# Patient Record
Sex: MALE | Race: WHITE | NOT HISPANIC OR LATINO | Employment: FULL TIME | URBAN - NONMETROPOLITAN AREA
[De-identification: names, ages, dates, MRNs, and addresses within clinical notes are randomized per-mention and may not be internally consistent; named-entity substitution may affect disease eponyms.]

---

## 2017-04-03 ENCOUNTER — COMMUNICATION - GICH (OUTPATIENT)
Dept: FAMILY MEDICINE | Facility: OTHER | Age: 42
End: 2017-04-03

## 2017-04-03 DIAGNOSIS — Z20.828 CONTACT WITH AND (SUSPECTED) EXPOSURE TO OTHER VIRAL COMMUNICABLE DISEASES: ICD-10-CM

## 2018-01-04 NOTE — TELEPHONE ENCOUNTER
Patient Information     Patient Name MRN Sex Lance Last [id number] Male 1975      Telephone Encounter by Kelli Maloney MD at 4/3/2017  4:10 PM     Author:  Kelli Maloney MD Service:  (none) Author Type:  Physician     Filed:  4/3/2017  4:11 PM Encounter Date:  4/3/2017 Status:  Signed     :  Kelli Maloney MD (Physician)            Prescription for tamiflu 75 mg daily x 10 days sent to [facility] pharmacy in [place], [place].  Kelli Maloney MD ....................  4/3/2017   4:11 PM

## 2018-01-04 NOTE — TELEPHONE ENCOUNTER
Patient Information     Patient Name MRN Lance Avina [id number] Male 1975      Telephone Encounter by Nieves Rivero at 4/3/2017  3:57 PM     Author:  Nieves Rivero Service:  (none) Author Type:  (none)     Filed:  4/3/2017  4:00 PM Encounter Date:  4/3/2017 Status:  Signed     :  Nieves Rivero            Call from wife with the pharmacy to send a Tamiflu prescription to.  I loaded this into chart.  WifeLatisha only needs a call back if there is a problem.  Nieves Rivero CMA (AAMA)................ 4/3/2017 4:00 PM

## 2018-02-19 ENCOUNTER — DOCUMENTATION ONLY (OUTPATIENT)
Dept: FAMILY MEDICINE | Facility: OTHER | Age: 43
End: 2018-02-19

## 2018-02-19 RX ORDER — LISINOPRIL 10 MG/1
1 TABLET ORAL DAILY
COMMUNITY
Start: 2013-08-27 | End: 2022-01-07

## 2018-02-19 RX ORDER — TRIAMTERENE/HYDROCHLOROTHIAZID 37.5-25 MG
1 TABLET ORAL DAILY
COMMUNITY
Start: 2013-08-27

## 2021-01-05 ENCOUNTER — VIRTUAL VISIT (OUTPATIENT)
Dept: FAMILY MEDICINE | Facility: OTHER | Age: 46
End: 2021-01-05
Attending: PHYSICIAN ASSISTANT
Payer: COMMERCIAL

## 2021-01-05 VITALS — HEIGHT: 73 IN | WEIGHT: 260 LBS | BODY MASS INDEX: 34.46 KG/M2

## 2021-01-05 DIAGNOSIS — M10.9 ACUTE GOUT OF LEFT FOOT, UNSPECIFIED CAUSE: Primary | ICD-10-CM

## 2021-01-05 PROCEDURE — 99212 OFFICE O/P EST SF 10 MIN: CPT | Mod: 95 | Performed by: PHYSICIAN ASSISTANT

## 2021-01-05 RX ORDER — PREDNISONE 20 MG/1
20 TABLET ORAL 2 TIMES DAILY
Qty: 20 TABLET | Refills: 0 | Status: SHIPPED | OUTPATIENT
Start: 2021-01-05 | End: 2022-01-07

## 2021-01-05 SDOH — HEALTH STABILITY: MENTAL HEALTH: HOW OFTEN DO YOU HAVE 6 OR MORE DRINKS ON ONE OCCASION?: NOT ASKED

## 2021-01-05 SDOH — HEALTH STABILITY: MENTAL HEALTH: HOW OFTEN DO YOU HAVE A DRINK CONTAINING ALCOHOL?: NOT ASKED

## 2021-01-05 SDOH — HEALTH STABILITY: MENTAL HEALTH: HOW MANY STANDARD DRINKS CONTAINING ALCOHOL DO YOU HAVE ON A TYPICAL DAY?: NOT ASKED

## 2021-01-05 ASSESSMENT — MIFFLIN-ST. JEOR: SCORE: 2108.23

## 2021-01-05 NOTE — NURSING NOTE
Patient states that he has gout in left foot.  Mica Larry LPN ....................  1/5/2021   9:49 AM

## 2021-01-05 NOTE — PROGRESS NOTES
Lance Vergara is a 45 year old male who is being evaluated via a billable telephone visit.      What phone number would you like to be contacted at? [phone]  How would you like to obtain your AVS? Mail a copy  Assessment & Plan     1. Acute gout of left foot, unspecified cause      Symptoms consistent with gout flare. Will treat with prednisone as has done well with that in the past. Educated on dietary and lifestyle changes to help prevent additional flares. Follow up as needed.     10 minutes spent on the date of the encounter doing chart review, patient visit and documentation     Tobacco Cessation:   reports that he has never smoked. He uses smokeless tobacco.    Susana Vance PA-C  [facility]    Subjective       HPI   Lance Vergara is a 45 year old male who is contacted via telephone for discussion regarding gout. States he has had a few flares in the past and has done well with daily BID dosing of prednisone for 5-10 days. State he was working in [place] a few weeks ago when he had another flare in his left foot. He was seen out there and they prescribed a prednisone taper. States that helped with the pain but the swelling and erythema did not improve. A few days ago he again developed pain in the left foot. States his uric acid level was checked at the [place] clinic and it was in the upper limits of normal. No known injury to the area. States he had been eating more seafood and pickled foods which he feels prompted the two flares.       PAST MEDICAL HISTORY:   Past Medical History:   Diagnosis Date     Atrial fibrillation (H)     No Comments Provided     Essential hypertension     No Comments Provided     Obesity     No Comments Provided       PAST SURGICAL HISTORY: No past surgical history on file.    FAMILY HISTORY: No family history on file.    SOCIAL HISTORY:   Social History     Tobacco Use     Smoking status: Never Smoker     Smokeless tobacco: Current User   Substance Use  Topics     Alcohol use: Not Currently      No Known Allergies  Current Outpatient Medications   Medication     lisinopril (PRINIVIL/ZESTRIL) 10 MG tablet     triamterene-hydrochlorothiazide (MAXZIDE-25) 37.5-25 MG per tablet     No current facility-administered medications for this visit.          Review of Systems   Constitutional, HEENT, cardiovascular, pulmonary, gi and gu systems are negative, except as otherwise noted.      Objective         Vitals:  No vitals were obtained today due to virtual visit.    Physical Exam   healthy, alert and no distress  PSYCH: Alert and oriented times 3; coherent speech, normal   rate and volume, able to articulate logical thoughts, able   to abstract reason, no tangential thoughts, no hallucinations   or delusions  His affect is normal  RESP: No cough, no audible wheezing, able to talk in full sentences  Remainder of exam unable to be completed due to telephone visits          Phone call duration: 5 minutes    Susana Vance PA-C on 1/5/2021 at 9:54 AM

## 2022-01-07 ENCOUNTER — VIRTUAL VISIT (OUTPATIENT)
Dept: FAMILY MEDICINE | Facility: OTHER | Age: 47
End: 2022-01-07
Attending: FAMILY MEDICINE

## 2022-01-07 VITALS
HEART RATE: 64 BPM | SYSTOLIC BLOOD PRESSURE: 124 MMHG | BODY MASS INDEX: 34.96 KG/M2 | TEMPERATURE: 98.4 F | DIASTOLIC BLOOD PRESSURE: 74 MMHG | WEIGHT: 265 LBS

## 2022-01-07 DIAGNOSIS — M10.9 ACUTE GOUT OF LEFT FOOT, UNSPECIFIED CAUSE: Primary | ICD-10-CM

## 2022-01-07 PROCEDURE — 99212 OFFICE O/P EST SF 10 MIN: CPT | Mod: 95 | Performed by: FAMILY MEDICINE

## 2022-01-07 RX ORDER — LISINOPRIL 30 MG/1
TABLET ORAL
COMMUNITY
Start: 2021-11-26

## 2022-01-07 RX ORDER — SILDENAFIL 50 MG/1
50-100 TABLET, FILM COATED ORAL
COMMUNITY
Start: 2021-11-11

## 2022-01-07 RX ORDER — DILTIAZEM HYDROCHLORIDE 240 MG/1
240 CAPSULE, EXTENDED RELEASE ORAL
COMMUNITY
Start: 2022-01-01 | End: 2022-01-31

## 2022-01-07 RX ORDER — PREDNISONE 20 MG/1
20 TABLET ORAL 2 TIMES DAILY
Qty: 20 TABLET | Refills: 0 | Status: SHIPPED | OUTPATIENT
Start: 2022-01-07 | End: 2022-01-14

## 2022-01-07 RX ORDER — METOPROLOL TARTRATE 100 MG
100 TABLET ORAL
COMMUNITY
Start: 2021-10-27

## 2022-01-07 RX ORDER — ATORVASTATIN CALCIUM 40 MG/1
TABLET, FILM COATED ORAL
COMMUNITY
Start: 2021-11-10

## 2022-01-07 ASSESSMENT — PAIN SCALES - GENERAL: PAINLEVEL: SEVERE PAIN (7)

## 2022-01-07 NOTE — NURSING NOTE
Patient is needing visit today for left big toe pain and swelling. Patient reports having a history of gout.    Medication Reconciliation Complete    Kamla Mata LPN  1/7/2022 2:20 PM

## 2022-01-07 NOTE — PROGRESS NOTES
Lance is a 46 year old who is being evaluated via a billable telephone visit.      What phone number would you like to be contacted at? [phone]  How would you like to obtain your AVS?     Assessment & Plan     Acute gout of left foot, unspecified cause  He has had this numerous times in the past and reports that this feels similar.  He has been on numerous courses of steroids and other anti-inflammatories in the past and reports that prednisone 20 mg twice daily for 10 days works quite well for him.  He is tolerated this well in the past.  We'll send that in again for him.  He'll contact me next week if this is not helping.  - predniSONE (DELTASONE) 20 MG tablet; Take 1 tablet (20 mg) by mouth 2 times daily for 10 days    Jose Luis Richards  [facility]    Subjective   Lance is a 46 year old who presents for the following health issues      HPI     Gout/ Single Inflamed Joint  He has a history of recurrent gout.  Recently he was in the emergency department in [place] where he was getting Lovenox injections.  He was told that this may cause a flareup of his gout.  He has had pain, swelling and redness of his left great toe that is consistent with his gout flareups in the past.    Review of Systems   Constitutional, HEENT, cardiovascular, pulmonary, gi and gu systems are negative, except as otherwise noted.      Objective    Vitals - Patient Reported  Pain Score: Severe Pain (7)        Physical Exam   healthy, alert and no distress  PSYCH: Alert and oriented times 3; coherent speech, normal   rate and volume, able to articulate logical thoughts, able   to abstract reason, no tangential thoughts, no hallucinations   or delusions  His affect is normal  RESP: No cough, no audible wheezing, able to talk in full sentences  Remainder of exam unable to be completed due to telephone visits        Phone call duration: 6 minutes

## 2022-01-14 DIAGNOSIS — M10.9 ACUTE GOUT OF LEFT FOOT, UNSPECIFIED CAUSE: ICD-10-CM

## 2022-01-14 RX ORDER — PREDNISONE 20 MG/1
20 TABLET ORAL 2 TIMES DAILY
Qty: 20 TABLET | Refills: 0 | Status: SHIPPED | OUTPATIENT
Start: 2022-01-14 | End: 2022-04-29

## 2022-01-14 NOTE — TELEPHONE ENCOUNTER
predniSONE (DELTASONE) 20 MG tablet 20 tablet 0 1/7/2022 1/17/2022 --   Sig - Route: Take 1 tablet (20 mg) by mouth 2 times daily for 10 days - Oral   Sent to pharmacy as: predniSONE 20 MG Oral Tablet (DELTASONE)   Class: E-Prescribe   Order: [id number]   E-Prescribing Status: Receipt confirmed by pharmacy (1/7/2022  2:45 PM CST)     LOV 01/07/2022 virtual visit with Dr. Richards. Noted patient was given 10 day course and advised to follow up if symptoms did not improve. Patient requests refill only at this time.  Previously prescribed via Dr. Richards. Unable to complete prescription refill per RN Medication Refill Policy.................... Sherly Fregoso RN ....................  1/14/2022   10:33 AM

## 2022-01-14 NOTE — TELEPHONE ENCOUNTER
Reason for call: Medication or medication refill    Name of medication requested: Prednisone    Are you out of the medication? Yes    What pharmacy do you use? [facility] in [place]    Preferred method for responding to this message: Telephone Call    Phone number patient can be reached at: Home number on file [phone]    If we cannot reach you directly, may we leave a detailed response at the number you provided? Yes     Patient states his gout is almost gone but not quite. He is able to walk on his foot but he still has some gout

## 2022-04-29 ENCOUNTER — HOSPITAL ENCOUNTER (EMERGENCY)
Facility: OTHER | Age: 47
Discharge: HOME | End: 2022-04-29
Attending: STUDENT IN AN ORGANIZED HEALTH CARE EDUCATION/TRAINING PROGRAM
Payer: COMMERCIAL

## 2022-04-29 ENCOUNTER — HOSPITAL ENCOUNTER (OUTPATIENT)
Dept: CARDIOLOGY | Facility: OTHER | Age: 47
Discharge: HOME | End: 2022-04-29
Attending: STUDENT IN AN ORGANIZED HEALTH CARE EDUCATION/TRAINING PROGRAM
Payer: COMMERCIAL

## 2022-04-29 VITALS
TEMPERATURE: 97.7 F | HEIGHT: 72 IN | OXYGEN SATURATION: 98 % | HEART RATE: 71 BPM | BODY MASS INDEX: 36.84 KG/M2 | DIASTOLIC BLOOD PRESSURE: 55 MMHG | RESPIRATION RATE: 11 BRPM | SYSTOLIC BLOOD PRESSURE: 117 MMHG | WEIGHT: 272 LBS

## 2022-04-29 DIAGNOSIS — R00.2 PALPITATIONS: ICD-10-CM

## 2022-04-29 DIAGNOSIS — E87.6 HYPOKALEMIA: ICD-10-CM

## 2022-04-29 DIAGNOSIS — I49.3 FREQUENT PVCS: ICD-10-CM

## 2022-04-29 DIAGNOSIS — M10.071 ACUTE IDIOPATHIC GOUT OF RIGHT FOOT: ICD-10-CM

## 2022-04-29 LAB
ANION GAP SERPL CALCULATED.3IONS-SCNC: 9 MMOL/L (ref 3–14)
BASOPHILS # BLD AUTO: 0.1 10E3/UL (ref 0–0.2)
BASOPHILS NFR BLD AUTO: 1 %
BUN SERPL-MCNC: 10 MG/DL (ref 7–25)
CALCIUM SERPL-MCNC: 9.4 MG/DL (ref 8.6–10.3)
CHLORIDE BLD-SCNC: 100 MMOL/L (ref 98–107)
CO2 SERPL-SCNC: 28 MMOL/L (ref 21–31)
CREAT SERPL-MCNC: 0.87 MG/DL (ref 0.7–1.3)
EOSINOPHIL # BLD AUTO: 0.3 10E3/UL (ref 0–0.7)
EOSINOPHIL NFR BLD AUTO: 2 %
ERYTHROCYTE [DISTWIDTH] IN BLOOD BY AUTOMATED COUNT: 12.1 % (ref 10–15)
GFR SERPL CREATININE-BSD FRML MDRD: >90 ML/MIN/1.73M2
GLUCOSE BLD-MCNC: 152 MG/DL (ref 70–105)
HCT VFR BLD AUTO: 47.2 % (ref 40–53)
HGB BLD-MCNC: 16.2 G/DL (ref 13.3–17.7)
IMM GRANULOCYTES # BLD: 0 10E3/UL
IMM GRANULOCYTES NFR BLD: 0 %
LYMPHOCYTES # BLD AUTO: 3.4 10E3/UL (ref 0.8–5.3)
LYMPHOCYTES NFR BLD AUTO: 30 %
MAGNESIUM SERPL-MCNC: 2 MG/DL (ref 1.9–2.7)
MCH RBC QN AUTO: 31.7 PG (ref 26.5–33)
MCHC RBC AUTO-ENTMCNC: 34.3 G/DL (ref 31.5–36.5)
MCV RBC AUTO: 92 FL (ref 78–100)
MONOCYTES # BLD AUTO: 0.7 10E3/UL (ref 0–1.3)
MONOCYTES NFR BLD AUTO: 6 %
NEUTROPHILS # BLD AUTO: 7 10E3/UL (ref 1.6–8.3)
NEUTROPHILS NFR BLD AUTO: 61 %
NRBC # BLD AUTO: 0 10E3/UL
NRBC BLD AUTO-RTO: 0 /100
PLATELET # BLD AUTO: 272 10E3/UL (ref 150–450)
POTASSIUM BLD-SCNC: 3.4 MMOL/L (ref 3.5–5.1)
RBC # BLD AUTO: 5.11 10E6/UL (ref 4.4–5.9)
SODIUM SERPL-SCNC: 137 MMOL/L (ref 134–144)
TROPONIN I SERPL-MCNC: 5.1 PG/ML (ref 0–34)
TSH SERPL DL<=0.005 MIU/L-ACNC: 0.98 MU/L (ref 0.4–4)
WBC # BLD AUTO: 11.5 10E3/UL (ref 4–11)

## 2022-04-29 PROCEDURE — 36415 COLL VENOUS BLD VENIPUNCTURE: CPT | Performed by: STUDENT IN AN ORGANIZED HEALTH CARE EDUCATION/TRAINING PROGRAM

## 2022-04-29 PROCEDURE — 80048 BASIC METABOLIC PNL TOTAL CA: CPT | Performed by: STUDENT IN AN ORGANIZED HEALTH CARE EDUCATION/TRAINING PROGRAM

## 2022-04-29 PROCEDURE — 99284 EMERGENCY DEPT VISIT MOD MDM: CPT | Performed by: STUDENT IN AN ORGANIZED HEALTH CARE EDUCATION/TRAINING PROGRAM

## 2022-04-29 PROCEDURE — 99285 EMERGENCY DEPT VISIT HI MDM: CPT | Mod: 25 | Performed by: STUDENT IN AN ORGANIZED HEALTH CARE EDUCATION/TRAINING PROGRAM

## 2022-04-29 PROCEDURE — 93005 ELECTROCARDIOGRAM TRACING: CPT | Performed by: STUDENT IN AN ORGANIZED HEALTH CARE EDUCATION/TRAINING PROGRAM

## 2022-04-29 PROCEDURE — 250N000013 HC RX MED GY IP 250 OP 250 PS 637: Performed by: STUDENT IN AN ORGANIZED HEALTH CARE EDUCATION/TRAINING PROGRAM

## 2022-04-29 PROCEDURE — 93242 EXT ECG>48HR<7D RECORDING: CPT

## 2022-04-29 PROCEDURE — 83735 ASSAY OF MAGNESIUM: CPT | Performed by: STUDENT IN AN ORGANIZED HEALTH CARE EDUCATION/TRAINING PROGRAM

## 2022-04-29 PROCEDURE — 85025 COMPLETE CBC W/AUTO DIFF WBC: CPT | Performed by: STUDENT IN AN ORGANIZED HEALTH CARE EDUCATION/TRAINING PROGRAM

## 2022-04-29 PROCEDURE — 84484 ASSAY OF TROPONIN QUANT: CPT | Performed by: STUDENT IN AN ORGANIZED HEALTH CARE EDUCATION/TRAINING PROGRAM

## 2022-04-29 PROCEDURE — 93010 ELECTROCARDIOGRAM REPORT: CPT | Performed by: INTERNAL MEDICINE

## 2022-04-29 PROCEDURE — 84443 ASSAY THYROID STIM HORMONE: CPT | Performed by: STUDENT IN AN ORGANIZED HEALTH CARE EDUCATION/TRAINING PROGRAM

## 2022-04-29 RX ORDER — PREDNISONE 20 MG/1
20 TABLET ORAL 2 TIMES DAILY
Qty: 10 TABLET | Refills: 0 | Status: SHIPPED | OUTPATIENT
Start: 2022-04-29 | End: 2022-05-02

## 2022-04-29 RX ORDER — LORAZEPAM 1 MG/1
1 TABLET ORAL 2 TIMES DAILY PRN
Qty: 4 TABLET | Refills: 0 | Status: SHIPPED | OUTPATIENT
Start: 2022-04-29

## 2022-04-29 RX ORDER — LORAZEPAM 1 MG/1
1 TABLET ORAL ONCE
Status: COMPLETED | OUTPATIENT
Start: 2022-04-29 | End: 2022-04-29

## 2022-04-29 RX ADMIN — LORAZEPAM 1 MG: 1 TABLET ORAL at 16:45

## 2022-04-29 NOTE — ED TRIAGE NOTES
ED Nursing Triage Note (General)   ________________________________    Lance Vergara is a 46 year old Male that presents to triage private car  With history of  Palpations has hx of high blood pressure, States has had this in past  reported by patient   Significant symptoms had onset started Sunday, states common to have couple a day   BP (!) 143/61   Pulse 76   Temp 97.7  F (36.5  C) (Temporal)   Resp 20   Ht 1.829 m (6')   Wt 123.4 kg (272 lb)   SpO2 98%   BMI 36.89 kg/m  t  Patient appears alert  and oriented, in no acute distress, but was mildly anxious., and cooperative behavior.    GCS Total = 15  Airway: intact  Breathing noted as Normal  Circulation Normal  Skin:  Normal  HR in normal range. Pulse slight irregular       PRE HOSPITAL PRIOR LIVING SITUATION Spouse and Children   Triage Assessment     Row Name 04/29/22 9051       Triage Assessment (Adult)    Airway WDL WDL       Respiratory WDL    Respiratory WDL WDL       Skin Circulation/Temperature WDL    Skin Circulation/Temperature WDL X;temperature    Skin Temperature warm       Cardiac WDL    Cardiac WDL X;rhythm    Cardiac Rhythm apical pulse irregular  HR 76       Peripheral/Neurovascular WDL    Peripheral Neurovascular WDL WDL       Cognitive/Neuro/Behavioral WDL    Cognitive/Neuro/Behavioral WDL WDL

## 2022-04-29 NOTE — DISCHARGE INSTRUCTIONS
You were found to have frequent premature ventricular contractions, which is a type of extra beating in the lower chambers of your heart. No dangerous cause of this was discovered today. You will need to follow up closely with your primary doctor next week (schedule a visit) and also as planned with your cardiologist mid-May.    You can speak with your primary doctor next week about further evaluation and management of your anxiety. Much of this is related to significant recent life-stressors. For now, take ativan 1 mg twice daily as needed for severe anxiety symptoms or any other acute concerns. This is a controlled substance and carries risk of dependence. This is not a good long-term treatment for anxiety.    Hold diltiazem for now but call your cardiologist office on Monday about restarting this; it may need to be restarted at a different dose.    Continue your other medications as prescribed.     prednisone at the pharmacy for treatment of gout in your right foot. Discuss with your doctor about stopping hydrochlorothiazide (one of your blood pressure medications) which can increase risk of gout attacks.    Your potassium was a bit low. This should improve with a normal diet.    A ZioPatch was placed today. Send this in in 1 week to have it analyzed and follow up on results with your cardiologist.    Come back to the ER immediately or call 911 if new or worsening symptoms including worsening palpitations, chest pain or difficulty breathing, or any other acute concerns.

## 2022-04-29 NOTE — ED PROVIDER NOTES
History     Chief Complaint   Patient presents with     Palpitations     HPI  Lance Vergara is a 46 year old man with history of HTN, obesity, HLD, paroxysmal a.fib on metoprolol (previously on diltiazem), not on anticoagulation including aspirin who presents for evaluation of palpitations. No chest pain or SOB, nausea/vomiting, sweating, or any other complaints but reports palpitations starting 5 days ago that worsened today.  Symptoms been present throughout the day, seem to come and go at random but more persistent today.  He does report feeling a bit dehydrated and that his urine was dark this morning.  He denies significant caffeine use.  He does note that his father passed away 3 weeks ago and he has been under a great deal of stress and anxiety from this.  He notes some racing thoughts and difficulty sleeping at night.    Allergies:  No Known Allergies    Problem List:    Patient Active Problem List    Diagnosis Date Noted     Hyperlipidemia 01/28/2016     Priority: Medium     Paroxysmal A-fib (H) 09/10/2015     Priority: Medium     HTN (hypertension) 10/16/2013     Priority: Medium     Obesity 10/16/2013     Priority: Medium        Past Medical History:    Past Medical History:   Diagnosis Date     Atrial fibrillation (H)      Essential hypertension      Obesity        Past Surgical History:    History reviewed. No pertinent surgical history.    Family History:    History reviewed. No pertinent family history.    Social History:  Marital Status:   [2]  Social History     Tobacco Use     Smoking status: Never Smoker     Smokeless tobacco: Current User     Types: Chew   Vaping Use     Vaping Use: Never used   Substance Use Topics     Alcohol use: Not Currently     Comment: 2 weeks ago last drink     Drug use: Not Currently     Comment: Drug use: No        Medications:    atorvastatin (LIPITOR) 40 MG tablet  lisinopril (ZESTRIL) 30 MG tablet  LORazepam (ATIVAN) 1 MG tablet  metoprolol tartrate  (LOPRESSOR) 100 MG tablet  predniSONE (DELTASONE) 20 MG tablet  triamterene-hydrochlorothiazide (MAXZIDE-25) 37.5-25 MG per tablet  diltiazem ER (DILT-XR) 240 MG 24 hr ER beaded capsule  sildenafil (VIAGRA) 50 MG tablet          Review of Systems  Please see HPI above for pertinent positives and negatives.  All other systems reviewed and found to be negative.    Physical Exam   BP: (!) 143/61  Pulse: 76  Temp: 97.7  F (36.5  C)  Resp: 20  Height: 182.9 cm (6')  Weight: 123.4 kg (272 lb)  SpO2: 98 %      Physical Exam  Gen: Lying in bed, alert, nontoxic, no acute distress   HEENT: normocephalic and atraumatic, mucous memories moist, conjunctiva clear regular rate and rhythm but frequent  CV: Regular rate and rhythm but frequent extra beats, no murmurs, appears warm and well-perfused  Pulm: Clear bilaterally, normal respiratory effort  Abd: Soft, nontender, nondistended  Skin: No rash or lesions  MSK: No gross deformities or swelling  Neuro: Alert and oriented x4, no focal deficits    ED Course              ED Course as of 22 0915      1623 EKG reviewed, sinus rhythm with a couple of PVCs, no STEMI or current of injury, rate is 74 bpm, normal OK interval, QRS, and QTc.   1636 Patient evaluated, here with palpitations over the past 5 days, now worse today. No a.fib on monitor but frequent PVC's. He has been under a great deal of stress lately, states his father  3 weeks ago and he has been under a lot of stress/anxiety. No chest pain or difficulty breathing or other symptoms besides racing thoughts. No thoughts of self harm. Also feels dehydrated, urine has been darker. Plan for oral hydration, basic labs, period of cardiac monitoring here, ativan for anxiety. Anticipate discharge with ZioPatch and close outpatient cardiac follow up   1704 CBC with very mild leukocytosis to 11.5, otherwise unremarkable, normal hemoglobin and platelets   1729 Troponin within normal limits   1736 Magnesium  normal. BMP with mild hypokalemia to 3.4, otherwise unremarkable     Procedures              Critical Care time:  none               Results for orders placed or performed during the hospital encounter of 04/29/22 (from the past 24 hour(s))   CBC with platelets differential    Narrative    The following orders were created for panel order CBC with platelets differential.  Procedure                               Abnormality         Status                     ---------                               -----------         ------                     CBC with platelets and d...[[id number]]  Abnormal            Final result                 Please view results for these tests on the individual orders.   Basic metabolic panel   Result Value Ref Range    Sodium 137 134 - 144 mmol/L    Potassium 3.4 (L) 3.5 - 5.1 mmol/L    Chloride 100 98 - 107 mmol/L    Carbon Dioxide (CO2) 28 21 - 31 mmol/L    Anion Gap 9 3 - 14 mmol/L    Urea Nitrogen 10 7 - 25 mg/dL    Creatinine 0.87 0.70 - 1.30 mg/dL    Calcium 9.4 8.6 - 10.3 mg/dL    Glucose 152 (H) 70 - 105 mg/dL    GFR Estimate >90 >60 mL/min/1.73m2   Troponin I   Result Value Ref Range    Troponin I 5.1 0.0 - 34.0 pg/mL   Magnesium   Result Value Ref Range    Magnesium 2.0 1.9 - 2.7 mg/dL   CBC with platelets and differential   Result Value Ref Range    WBC Count 11.5 (H) 4.0 - 11.0 10e3/uL    RBC Count 5.11 4.40 - 5.90 10e6/uL    Hemoglobin 16.2 13.3 - 17.7 g/dL    Hematocrit 47.2 40.0 - 53.0 %    MCV 92 78 - 100 fL    MCH 31.7 26.5 - 33.0 pg    MCHC 34.3 31.5 - 36.5 g/dL    RDW 12.1 10.0 - 15.0 %    Platelet Count 272 150 - 450 10e3/uL    % Neutrophils 61 %    % Lymphocytes 30 %    % Monocytes 6 %    % Eosinophils 2 %    % Basophils 1 %    % Immature Granulocytes 0 %    NRBCs per 100 WBC 0 <1 /100    Absolute Neutrophils 7.0 1.6 - 8.3 10e3/uL    Absolute Lymphocytes 3.4 0.8 - 5.3 10e3/uL    Absolute Monocytes 0.7 0.0 - 1.3 10e3/uL    Absolute Eosinophils 0.3 0.0 - 0.7 10e3/uL     Absolute Basophils 0.1 0.0 - 0.2 10e3/uL    Absolute Immature Granulocytes 0.0 <=0.4 10e3/uL    Absolute NRBCs 0.0 10e3/uL   TSH Reflex GH   Result Value Ref Range    TSH 0.98 0.40 - 4.00 mU/L       Medications   LORazepam (ATIVAN) tablet 1 mg (1 mg Oral Given 4/29/22 1645)       Assessments & Plan (with Medical Decision Making)   46 year old man with history of HTN, obesity, HLD, paroxysmal a.fib on metoprolol (previously on diltiazem), not on anticoagulation including aspirin who presents for evaluation of palpitations that started about 5 days ago but worsened today.  Vitally stable here, afebrile, saturating well on room air.  Patient with normal sinus rhythm but frequent PVCs on cardiac monitoring and on EKG, no ischemic changes.  Lab work was unrevealing.  Patient notably has had significant increase stress in his life including the recent death of his father 3 weeks ago likely contributing to these PVCs, he was given lorazepam 1 mg orally and improved on recheck.  No evidence of atrial fibrillation notably here, patient does have a [id number] of 1 and so would possibly benefit from full dose aspirin but will let him discuss this further with his primary provider and cardiologist.  Instructed him to continue to hold diltiazem at this time as he has been off it for about a month and would prefer not to restart such a high dose of the medication especially given that his blood pressure improved here to normal after treatment of his anxiety with Ativan.  During his ED course he did develop some pain in his right foot that is identical to prior gout attacks; will plan for prednisone burst which has worked very well for him in the past.  He will need close outpatient follow-up, did provide a Zio patch today for 1 week of monitoring, strict return precautions provided.    From ED discharge instructions:  You were found to have frequent premature ventricular contractions, which is a type of extra beating in the  lower chambers of your heart. No dangerous cause of this was discovered today. You will need to follow up closely with your primary doctor next week (schedule a visit) and also as planned with your cardiologist mid-May.    You can speak with your primary doctor next week about further evaluation and management of your anxiety. Much of this is related to significant recent life-stressors. For now, take ativan 1 mg twice daily as needed for severe anxiety symptoms or any other acute concerns. This is a controlled substance and carries risk of dependence. This is not a good long-term treatment for anxiety.    Hold diltiazem for now but call your cardiologist office on Monday about restarting this; it may need to be restarted at a different dose.    Continue your other medications as prescribed.     prednisone at the pharmacy for treatment of gout in your right foot. Discuss with your doctor about stopping hydrochlorothiazide (one of your blood pressure medications) which can increase risk of gout attacks.    Your potassium was a bit low. This should improve with a normal diet.    A ZioPatch was placed today. Send this in in 1 week to have it analyzed and follow up on results with your cardiologist.    Come back to the ER immediately or call 911 if new or worsening symptoms including worsening palpitations, chest pain or difficulty breathing, or any other acute concerns.    I have reviewed the nursing notes.    I have reviewed the findings, diagnosis, plan and need for follow up with the patient.       Discharge Medication List as of 4/29/2022  6:48 PM      START taking these medications    Details   LORazepam (ATIVAN) 1 MG tablet Take 1 tablet (1 mg) by mouth 2 times daily as needed for anxiety, Disp-4 tablet, R-0, E-Prescribe      predniSONE (DELTASONE) 20 MG tablet Take 1 tablet (20 mg) by mouth 2 times daily for 5 days, Disp-10 tablet, R-0, E-Prescribe             Final diagnoses:   Palpitations   Frequent  PVCs   Hypokalemia   Acute idiopathic gout of right foot       4/29/2022   [facility] Grover Bales MD  04/30/22 7812

## 2022-04-30 NOTE — PATIENT INSTRUCTIONS
Date Placed on Pt:  4/29/2022    Patient instructed not to:   -take baths, swim, sauna   -remove device prior to 7 days   -use electric blankets   -shower or sweat excessively within first 24 hours of device application  Patient instructed to:   -shower as needed   -be carefull when toweling off and dressing   -press button when cardiac symptoms occur   -document symptoms in log book   -remove and return device (send via mail to [facility] [employer])   -Call [facility] with any questions

## 2022-05-01 LAB
ATRIAL RATE - MUSE: 74 BPM
DIASTOLIC BLOOD PRESSURE - MUSE: NORMAL MMHG
INTERPRETATION ECG - MUSE: NORMAL
P AXIS - MUSE: 14 DEGREES
PR INTERVAL - MUSE: 160 MS
QRS DURATION - MUSE: 84 MS
QT - MUSE: 404 MS
QTC - MUSE: 448 MS
R AXIS - MUSE: -15 DEGREES
SYSTOLIC BLOOD PRESSURE - MUSE: NORMAL MMHG
T AXIS - MUSE: -1 DEGREES
VENTRICULAR RATE- MUSE: 74 BPM

## 2022-05-02 ENCOUNTER — OFFICE VISIT (OUTPATIENT)
Dept: FAMILY MEDICINE | Facility: OTHER | Age: 47
End: 2022-05-02
Attending: STUDENT IN AN ORGANIZED HEALTH CARE EDUCATION/TRAINING PROGRAM
Payer: COMMERCIAL

## 2022-05-02 VITALS
HEIGHT: 72 IN | SYSTOLIC BLOOD PRESSURE: 163 MMHG | WEIGHT: 285 LBS | DIASTOLIC BLOOD PRESSURE: 99 MMHG | BODY MASS INDEX: 38.6 KG/M2 | RESPIRATION RATE: 18 BRPM | OXYGEN SATURATION: 98 % | HEART RATE: 67 BPM | TEMPERATURE: 96.8 F

## 2022-05-02 DIAGNOSIS — F43.22 ACUTE ADJUSTMENT DISORDER WITH ANXIETY: Primary | ICD-10-CM

## 2022-05-02 DIAGNOSIS — I10 PRIMARY HYPERTENSION: ICD-10-CM

## 2022-05-02 PROCEDURE — 99213 OFFICE O/P EST LOW 20 MIN: CPT | Performed by: STUDENT IN AN ORGANIZED HEALTH CARE EDUCATION/TRAINING PROGRAM

## 2022-05-02 RX ORDER — HYDROXYZINE HYDROCHLORIDE 25 MG/1
25 TABLET, FILM COATED ORAL EVERY 4 HOURS PRN
Qty: 90 TABLET | Refills: 1 | Status: SHIPPED | OUTPATIENT
Start: 2022-05-02

## 2022-05-02 ASSESSMENT — ANXIETY QUESTIONNAIRES
GAD7 TOTAL SCORE: 8
7. FEELING AFRAID AS IF SOMETHING AWFUL MIGHT HAPPEN: NOT AT ALL
7. FEELING AFRAID AS IF SOMETHING AWFUL MIGHT HAPPEN: NOT AT ALL
3. WORRYING TOO MUCH ABOUT DIFFERENT THINGS: SEVERAL DAYS
6. BECOMING EASILY ANNOYED OR IRRITABLE: NOT AT ALL
1. FEELING NERVOUS, ANXIOUS, OR ON EDGE: MORE THAN HALF THE DAYS
4. TROUBLE RELAXING: MORE THAN HALF THE DAYS
GAD7 TOTAL SCORE: 8
5. BEING SO RESTLESS THAT IT IS HARD TO SIT STILL: SEVERAL DAYS
2. NOT BEING ABLE TO STOP OR CONTROL WORRYING: MORE THAN HALF THE DAYS
GAD7 TOTAL SCORE: 8

## 2022-05-02 ASSESSMENT — PAIN SCALES - GENERAL: PAINLEVEL: NO PAIN (0)

## 2022-05-02 NOTE — NURSING NOTE
Patient presents to clinic for follow up after ER visit on 04/29/22.  He was seen for palpitations, hypokalemia and gout of right foot.  Patient is current wearing a Ziopatch monitor for a week. No longer having palpitations at this time. He took last dose of Prednisone 20mg this morning which was given for gout.    Medication Rec Complete  Elodia Kaplan LPN............5/2/2022 8:40 AM

## 2022-05-02 NOTE — PROGRESS NOTES
Assessment & Plan     Acute adjustment disorder with anxiety  Anxiety likely due to recent sudden/unexpected death of his father. Discussed treatment options including ssri/snri but is not interested at this time. We did review the safety concerns of ativan and he is ok to try hydroxyzine. Also just completed prednisone for gout which I think is likely making sleep and anxiety much worse.    - hydrOXYzine (ATARAX) 25 MG tablet; Take 1 tablet (25 mg) by mouth every 4 hours as needed for anxiety    Primary hypertension  Not at goal but asymptomatic. Checks at home and is 130-140s usually. Completed prednisone today. Is quite anxious. Treatment of anxiety as above. Follow up with PCP if home numbers consistently >130/80s      Hector Lee MD  [facility]    Subjective   Lance is a 46 year old who presents for the following health issues   Patient presents to clinic for follow up after ER visit on 04/29/22.  He was seen for palpitations, hypokalemia and gout of right foot.  Patient is current wearing a Ziopatch monitor for a week. No longer having palpitations at this time. He took last dose of Prednisone 20mg this morning which was given for gout.    History of Present Illness       Mental Health Follow-up:  Patient presents to follow-up on Anxiety.    Patient's anxiety since last visit has been:  Good  The patient is not having other symptoms associated with anxiety.  Any significant life events: grief or loss  Patient is feeling anxious or having panic attacks.  Patient has no concerns about alcohol or drug use.         Today's EWA-7 Score: 8    Reason for visit:  Follow up from ER visit  Symptom onset:  1-2 weeks ago    He eats 2-3 servings of fruits and vegetables daily.He consumes 1 sweetened beverage(s) daily.He exercises with enough effort to increase his heart rate 10 to 19 minutes per day.  He exercises with enough effort to increase his heart rate 4 days per week.   He is taking  medications regularly.     Follow up ED visit   - seen in the ED 4 days ago for palpitations, found to have PVCs  - symptoms likely anxiety related. Recent stressor in life includes sudden death of his father 4 weeks ago   - was given ativan in the ED and felt much better  - given Rx for ativan on discharge and has used once and helped with symptoms   - no chest pain or palpitations today  - notices it at night while he can't sleep and is laying awake  - has history of afib and follows with cardiology  - today was last day of prednisone for gout flare            Review of Systems   Constitutional, HEENT, cardiovascular, pulmonary, gi and gu systems are negative, except as otherwise noted.      Objective    BP (!) 164/98 (BP Location: Right arm, Patient Position: Sitting, Cuff Size: Adult Large)   Pulse 72   Temp 96.8  F (36  C) (Tympanic)   Resp 18   Ht 1.829 m (6')   Wt 129.3 kg (285 lb)   SpO2 98%   BMI 38.65 kg/m    Body mass index is 38.65 kg/m .  Physical Exam   GENERAL: healthy, alert  RESP: lungs clear to auscultation - no rales, rhonchi or wheezes  CV: regular rate and rhythm, normal S1 S2, no S3 or S4, no murmur, click or rub, no peripheral edema and peripheral pulses strong  MS: no gross musculoskeletal defects noted, no edema  PSYCH: anxious

## 2022-05-03 ENCOUNTER — TELEPHONE (OUTPATIENT)
Dept: FAMILY MEDICINE | Facility: OTHER | Age: 47
End: 2022-05-03
Payer: COMMERCIAL

## 2022-05-03 DIAGNOSIS — M10.9 ACUTE GOUT OF LEFT FOOT, UNSPECIFIED CAUSE: Primary | ICD-10-CM

## 2022-05-03 RX ORDER — PREDNISONE 20 MG/1
20 TABLET ORAL 2 TIMES DAILY
Qty: 10 TABLET | Refills: 0 | Status: CANCELLED | OUTPATIENT
Start: 2022-05-03 | End: 2022-05-08

## 2022-05-03 RX ORDER — PREDNISONE 20 MG/1
20 TABLET ORAL 2 TIMES DAILY
Qty: 6 TABLET | Refills: 0 | Status: SHIPPED | OUTPATIENT
Start: 2022-05-03 | End: 2022-05-06

## 2022-05-03 ASSESSMENT — ANXIETY QUESTIONNAIRES: GAD7 TOTAL SCORE: 8

## 2022-05-03 NOTE — TELEPHONE ENCOUNTER
Patient most likely received a different dose of medication when [place] of [place].  20mg bid is the typical dose.  Prescription is sent to complete prescription.  Sol Christianson MD

## 2022-05-03 NOTE — TELEPHONE ENCOUNTER
Spoke with patient. He states that he was seen on Friday and that he was prescribed prednisone for his gout flare. He stated that he used to take Prednisone 2 tabs twice daily so 4 tabs per day when he lived in [place]. Informed patient that his past prescriptions in his chart have all been 1 tab twice daily. Informed him that his dose could have been different when he was in [place]. He is out of the prednisone and still going through the gout flare. He stated he normally takes the prednisone for 10 days so he would need another 5 days. Informed him that I would send his request to his PCP and advised him to take his medication as prescribed on the bottle. He verbalized his understanding. Enrrique Manriquez RN, BSN  ....................  5/3/2022   10:59 AM

## 2022-05-03 NOTE — TELEPHONE ENCOUNTER
Contacted patient and informed him that his PCP sent in more prednisone. Advised him to call back if his gout has not resolved after completing prednisone. Enrrique Manriquez RN, BSN  ....................  5/3/2022   3:21 PM

## 2022-05-03 NOTE — TELEPHONE ENCOUNTER
The patient called and stated he must have had miscommunication about his prednisone.  He needs more prednisone.  He went over the amount with the nurse and should have went over it with the doctor instead.  He did not get enough of the prednisone.   [employer]

## 2024-07-18 ENCOUNTER — ANESTHESIA (OUTPATIENT)
Dept: SURGERY | Facility: OTHER | Age: 49
End: 2024-07-18
Payer: COMMERCIAL

## 2024-07-18 ENCOUNTER — HOSPITAL ENCOUNTER (OUTPATIENT)
Facility: OTHER | Age: 49
Discharge: HOME | End: 2024-07-18
Attending: SURGERY | Admitting: SURGERY
Payer: COMMERCIAL

## 2024-07-18 ENCOUNTER — ANESTHESIA EVENT (OUTPATIENT)
Dept: SURGERY | Facility: OTHER | Age: 49
End: 2024-07-18
Payer: COMMERCIAL

## 2024-07-18 ENCOUNTER — OFFICE VISIT (OUTPATIENT)
Dept: FAMILY MEDICINE | Facility: OTHER | Age: 49
End: 2024-07-18
Payer: COMMERCIAL

## 2024-07-18 ENCOUNTER — TELEPHONE (OUTPATIENT)
Dept: FAMILY MEDICINE | Facility: OTHER | Age: 49
End: 2024-07-18

## 2024-07-18 VITALS
WEIGHT: 254 LBS | BODY MASS INDEX: 34.4 KG/M2 | HEIGHT: 72 IN | HEART RATE: 87 BPM | OXYGEN SATURATION: 93 % | SYSTOLIC BLOOD PRESSURE: 101 MMHG | DIASTOLIC BLOOD PRESSURE: 49 MMHG | RESPIRATION RATE: 16 BRPM | TEMPERATURE: 97.6 F

## 2024-07-18 VITALS
WEIGHT: 254 LBS | RESPIRATION RATE: 19 BRPM | HEIGHT: 72 IN | DIASTOLIC BLOOD PRESSURE: 82 MMHG | TEMPERATURE: 98.3 F | BODY MASS INDEX: 34.4 KG/M2 | OXYGEN SATURATION: 97 % | HEART RATE: 87 BPM | SYSTOLIC BLOOD PRESSURE: 144 MMHG

## 2024-07-18 DIAGNOSIS — R13.10 DYSPHAGIA, UNSPECIFIED TYPE: Primary | ICD-10-CM

## 2024-07-18 PROCEDURE — 250N000011 HC RX IP 250 OP 636: Performed by: NURSE ANESTHETIST, CERTIFIED REGISTERED

## 2024-07-18 PROCEDURE — 250N000009 HC RX 250: Performed by: NURSE ANESTHETIST, CERTIFIED REGISTERED

## 2024-07-18 PROCEDURE — 258N000003 HC RX IP 258 OP 636: Performed by: SURGERY

## 2024-07-18 PROCEDURE — 258N000003 HC RX IP 258 OP 636: Performed by: NURSE ANESTHETIST, CERTIFIED REGISTERED

## 2024-07-18 PROCEDURE — 43239 EGD BIOPSY SINGLE/MULTIPLE: CPT | Performed by: SURGERY

## 2024-07-18 PROCEDURE — 88305 TISSUE EXAM BY PATHOLOGIST: CPT

## 2024-07-18 PROCEDURE — 43239 EGD BIOPSY SINGLE/MULTIPLE: CPT | Performed by: NURSE ANESTHETIST, CERTIFIED REGISTERED

## 2024-07-18 PROCEDURE — 99213 OFFICE O/P EST LOW 20 MIN: CPT | Performed by: STUDENT IN AN ORGANIZED HEALTH CARE EDUCATION/TRAINING PROGRAM

## 2024-07-18 RX ORDER — ONDANSETRON 2 MG/ML
4 INJECTION INTRAMUSCULAR; INTRAVENOUS EVERY 30 MIN PRN
Status: DISCONTINUED | OUTPATIENT
Start: 2024-07-18 | End: 2024-07-18 | Stop reason: HOSPADM

## 2024-07-18 RX ORDER — FLUMAZENIL 0.1 MG/ML
0.2 INJECTION, SOLUTION INTRAVENOUS
Status: DISCONTINUED | OUTPATIENT
Start: 2024-07-18 | End: 2024-07-18 | Stop reason: HOSPADM

## 2024-07-18 RX ORDER — LIDOCAINE 40 MG/G
CREAM TOPICAL
Status: CANCELLED | OUTPATIENT
Start: 2024-07-18

## 2024-07-18 RX ORDER — FENTANYL CITRATE 50 UG/ML
25 INJECTION, SOLUTION INTRAMUSCULAR; INTRAVENOUS EVERY 5 MIN PRN
Status: DISCONTINUED | OUTPATIENT
Start: 2024-07-18 | End: 2024-07-18 | Stop reason: HOSPADM

## 2024-07-18 RX ORDER — FENTANYL CITRATE 50 UG/ML
50 INJECTION, SOLUTION INTRAMUSCULAR; INTRAVENOUS EVERY 5 MIN PRN
Status: DISCONTINUED | OUTPATIENT
Start: 2024-07-18 | End: 2024-07-18 | Stop reason: HOSPADM

## 2024-07-18 RX ORDER — PROPOFOL 10 MG/ML
INJECTION, EMULSION INTRAVENOUS PRN
Status: DISCONTINUED | OUTPATIENT
Start: 2024-07-18 | End: 2024-07-18

## 2024-07-18 RX ORDER — DEXAMETHASONE SODIUM PHOSPHATE 4 MG/ML
INJECTION, SOLUTION INTRA-ARTICULAR; INTRALESIONAL; INTRAMUSCULAR; INTRAVENOUS; SOFT TISSUE PRN
Status: DISCONTINUED | OUTPATIENT
Start: 2024-07-18 | End: 2024-07-18

## 2024-07-18 RX ORDER — HYDROMORPHONE HCL IN WATER/PF 6 MG/30 ML
0.2 PATIENT CONTROLLED ANALGESIA SYRINGE INTRAVENOUS EVERY 5 MIN PRN
Status: DISCONTINUED | OUTPATIENT
Start: 2024-07-18 | End: 2024-07-18 | Stop reason: HOSPADM

## 2024-07-18 RX ORDER — ONDANSETRON 4 MG/1
4 TABLET, ORALLY DISINTEGRATING ORAL EVERY 30 MIN PRN
Status: DISCONTINUED | OUTPATIENT
Start: 2024-07-18 | End: 2024-07-18 | Stop reason: HOSPADM

## 2024-07-18 RX ORDER — LIDOCAINE 40 MG/G
CREAM TOPICAL
Status: DISCONTINUED | OUTPATIENT
Start: 2024-07-18 | End: 2024-07-18 | Stop reason: HOSPADM

## 2024-07-18 RX ORDER — ONDANSETRON 2 MG/ML
INJECTION INTRAMUSCULAR; INTRAVENOUS PRN
Status: DISCONTINUED | OUTPATIENT
Start: 2024-07-18 | End: 2024-07-18

## 2024-07-18 RX ORDER — HYDROMORPHONE HCL IN WATER/PF 6 MG/30 ML
0.4 PATIENT CONTROLLED ANALGESIA SYRINGE INTRAVENOUS EVERY 5 MIN PRN
Status: DISCONTINUED | OUTPATIENT
Start: 2024-07-18 | End: 2024-07-18 | Stop reason: HOSPADM

## 2024-07-18 RX ORDER — LIDOCAINE HYDROCHLORIDE 20 MG/ML
INJECTION, SOLUTION INFILTRATION; PERINEURAL PRN
Status: DISCONTINUED | OUTPATIENT
Start: 2024-07-18 | End: 2024-07-18

## 2024-07-18 RX ORDER — SODIUM CHLORIDE, SODIUM LACTATE, POTASSIUM CHLORIDE, CALCIUM CHLORIDE 600; 310; 30; 20 MG/100ML; MG/100ML; MG/100ML; MG/100ML
INJECTION, SOLUTION INTRAVENOUS CONTINUOUS
Status: DISCONTINUED | OUTPATIENT
Start: 2024-07-18 | End: 2024-07-18 | Stop reason: HOSPADM

## 2024-07-18 RX ORDER — NALOXONE HYDROCHLORIDE 0.4 MG/ML
0.4 INJECTION, SOLUTION INTRAMUSCULAR; INTRAVENOUS; SUBCUTANEOUS
Status: DISCONTINUED | OUTPATIENT
Start: 2024-07-18 | End: 2024-07-18 | Stop reason: HOSPADM

## 2024-07-18 RX ORDER — SODIUM CHLORIDE, SODIUM LACTATE, POTASSIUM CHLORIDE, CALCIUM CHLORIDE 600; 310; 30; 20 MG/100ML; MG/100ML; MG/100ML; MG/100ML
INJECTION, SOLUTION INTRAVENOUS CONTINUOUS
Status: CANCELLED | OUTPATIENT
Start: 2024-07-18

## 2024-07-18 RX ORDER — GLYCOPYRROLATE 0.2 MG/ML
INJECTION, SOLUTION INTRAMUSCULAR; INTRAVENOUS PRN
Status: DISCONTINUED | OUTPATIENT
Start: 2024-07-18 | End: 2024-07-18

## 2024-07-18 RX ORDER — NALOXONE HYDROCHLORIDE 0.4 MG/ML
0.2 INJECTION, SOLUTION INTRAMUSCULAR; INTRAVENOUS; SUBCUTANEOUS
Status: DISCONTINUED | OUTPATIENT
Start: 2024-07-18 | End: 2024-07-18 | Stop reason: HOSPADM

## 2024-07-18 RX ORDER — DEXAMETHASONE SODIUM PHOSPHATE 10 MG/ML
4 INJECTION, SOLUTION INTRAMUSCULAR; INTRAVENOUS
Status: DISCONTINUED | OUTPATIENT
Start: 2024-07-18 | End: 2024-07-18 | Stop reason: HOSPADM

## 2024-07-18 RX ORDER — NALOXONE HYDROCHLORIDE 0.4 MG/ML
0.1 INJECTION, SOLUTION INTRAMUSCULAR; INTRAVENOUS; SUBCUTANEOUS
Status: DISCONTINUED | OUTPATIENT
Start: 2024-07-18 | End: 2024-07-18 | Stop reason: HOSPADM

## 2024-07-18 RX ORDER — LORAZEPAM 1 MG/1
1 TABLET ORAL 2 TIMES DAILY PRN
Qty: 4 TABLET | Refills: 0 | Status: CANCELLED | OUTPATIENT
Start: 2024-07-18

## 2024-07-18 RX ADMIN — Medication 160 MG: at 13:53

## 2024-07-18 RX ADMIN — PROPOFOL 200 MG: 10 INJECTION, EMULSION INTRAVENOUS at 13:53

## 2024-07-18 RX ADMIN — SODIUM CHLORIDE, POTASSIUM CHLORIDE, SODIUM LACTATE AND CALCIUM CHLORIDE: 600; 310; 30; 20 INJECTION, SOLUTION INTRAVENOUS at 13:38

## 2024-07-18 RX ADMIN — PROPOFOL 50 MG: 10 INJECTION, EMULSION INTRAVENOUS at 13:59

## 2024-07-18 RX ADMIN — SODIUM CHLORIDE, POTASSIUM CHLORIDE, SODIUM LACTATE AND CALCIUM CHLORIDE: 600; 310; 30; 20 INJECTION, SOLUTION INTRAVENOUS at 14:32

## 2024-07-18 RX ADMIN — ROCURONIUM BROMIDE 5 MG: 50 INJECTION, SOLUTION INTRAVENOUS at 13:53

## 2024-07-18 RX ADMIN — LIDOCAINE HYDROCHLORIDE 100 MG: 20 INJECTION, SOLUTION INFILTRATION; PERINEURAL at 13:53

## 2024-07-18 RX ADMIN — GLYCOPYRROLATE 0.2 MG: 0.2 INJECTION, SOLUTION INTRAMUSCULAR; INTRAVENOUS at 14:00

## 2024-07-18 RX ADMIN — ONDANSETRON HYDROCHLORIDE 4 MG: 2 SOLUTION INTRAMUSCULAR; INTRAVENOUS at 13:53

## 2024-07-18 RX ADMIN — DEXAMETHASONE SODIUM PHOSPHATE 4 MG: 4 INJECTION, SOLUTION INTRA-ARTICULAR; INTRALESIONAL; INTRAMUSCULAR; INTRAVENOUS; SOFT TISSUE at 13:53

## 2024-07-18 RX ADMIN — MIDAZOLAM 2 MG: 1 INJECTION INTRAMUSCULAR; INTRAVENOUS at 13:37

## 2024-07-18 ASSESSMENT — ACTIVITIES OF DAILY LIVING (ADL)
ADLS_ACUITY_SCORE: 35

## 2024-07-18 ASSESSMENT — PAIN SCALES - GENERAL: PAINLEVEL: MILD PAIN (2)

## 2024-07-18 ASSESSMENT — ENCOUNTER SYMPTOMS: DYSRHYTHMIAS: 1

## 2024-07-18 NOTE — DISCHARGE INSTRUCTIONS
[facility]  Adult Discharge Orders & Instructions      For 24 hours after surgery:  Get plenty of rest.  A responsible adult must stay with you for at least 24 hours after you leave the hospital.   You may feel lightheaded.  IF so, sit for a few minutes before standing.  Have someone help you get up.   You may have a slight fever. Call the doctor if your fever is over 101 F (38.3 C) (taken under the tongue) or lasts longer than 24 hours.  You may have a dry mouth, a sore throat, muscle aches or trouble sleeping.  These should go away after 24 hours.  Do not make important or legal decisions.  6.   Do not drive or use heavy equipment.  If you have weakness or tingling, don't drive or use heavy equipment until this feeling goes away.                                                                                                                                                                         To contact a doctor, call    [phone]______________

## 2024-07-18 NOTE — PROGRESS NOTES
Assessment & Plan     (R13.10) Dysphagia, unspecified type  (primary encounter diagnosis)    Comment: Dysphasia.  Spoke with  on-call surgeon.  He recommended EGD.  Case was transferred to the day surgery team.    Plan: Case Request: ESOPHAGOGASTRODUODENOSCOPY, WITH         DILATION      Subjective   Lance is a 49 year old, presenting for the following health issues:  Nausea, Vomiting, and Insomnia    HPI     Patient presents today with concerns of worsening regurgitation.  He notes that over the past few years he has had some issues where he has taken Prilosec or Nexium which do seem to resolve issues.  However, over the last couple of weeks, he has had trouble keeping down any food.  He notes that he does swallow the food but approximately 30 seconds there after the same food bolus does come back up.  He notes that he has kept down some water, however over the past couple of days water intake is also decreased.  He notes that he has been dry heaving, feels like it is mostly saliva as it is clear in nature.  He notes a decrease in amount of stools, however stools have not been diarrhea or constipation.  He notes over the past day or two his urination has gone from light yellow to dark yellow.  No history of taking any sort of dietary medications.      Review of Systems  Constitutional, HEENT, cardiovascular, pulmonary, gi and gu systems are negative, except as otherwise noted.          Objective    BP (!) 144/82   Pulse 87   Temp 98.3  F (36.8  C) (Tympanic)   Resp 19   Ht 1.829 m (6')   Wt 115.2 kg (254 lb)   SpO2 97%   BMI 34.45 kg/m    Body mass index is 34.45 kg/m .      Physical Exam   GENERAL: alert and no distress  NECK: no adenopathy, no asymmetry, masses, or scars  RESP: lungs clear to auscultation - no rales, rhonchi or wheezes  CV: regular rate and rhythm, normal S1 S2  ABDOMEN: rounded, nontender, no masses and bowel sounds normal  MS: no gross musculoskeletal defects noted, no  edema        Signed Electronically by: Roselyn Hermosillo PA-C

## 2024-07-18 NOTE — PROGRESS NOTES
GENERAL SURGERY CONSULTATION NOTE    Lance Vergara   [address]  [facility] [address]  49 year old  male    Primary Care Provider:  Sol Guadarrama      HPI: Lance Vergara presents to Clinic with subacute dysphagia.  Over the past few weeks patient has had progressive dysphagia.  This morning he was unable to swallow his saliva.  Also notes that he has not been able to burp.  States that other members in his family had this problem.  Patient is a drinker and does chewing tobacco, he recently quit.  Denies dark stools or weight loss.      REVIEW OF SYSTEMS:    GENERAL: No fevers or chills. Denies fatigue, recent weight loss.  HEENT: No sinus drainage. No changes with vision or hearing. No difficulty swallowing.   LYMPHATICS:  Noswollen nodes in axilla, neck or groin.  CARDIOVASCULAR: Denies chest pain, palpitations and dyspnea on exertion.  PULMONARY: No shortness of breath or cough. No increase in sputum production.  GI: Denies melena,bright red blood in stools. No hematemesis. No constipation or diarrhea.  : No dysuria or hematuria.  SKIN: No recent rashes or ulcers.   HEMATOLOGY:  No history of easy bruising or bleeding.  ENDOCRINE:  No history of diabetes or thyroid problems.  NEUROLOGY:  No history of seizures or headaches. No motor or sensory changes.        Patient Active Problem List   Diagnosis    HTN (hypertension)    Obesity    Hyperlipidemia    Paroxysmal A-fib (H)    Acute adjustment disorder with anxiety       Past Medical History:   Diagnosis Date    Atrial fibrillation (H)     No Comments Provided    Essential hypertension     No Comments Provided    Obesity     No Comments Provided       History reviewed. No pertinent surgical history.    History reviewed. No pertinent family history.    Social History     Social History Narrative        Wife:  Latisha    Daughter:  Tanner       Social History     Socioeconomic History    Marital status:      Spouse name: Not on file     Number of children: Not on file    Years of education: Not on file    Highest education level: Not on file   Occupational History    Not on file   Tobacco Use    Smoking status: Never    Smokeless tobacco: Current     Types: Chew   Vaping Use    Vaping status: Never Used   Substance and Sexual Activity    Alcohol use: Not Currently     Comment: 2 weeks ago last drink    Drug use: Not Currently     Comment: Drug use: No    Sexual activity: Yes     Partners: Female   Other Topics Concern    Not on file   Social History Narrative        Wife:  Latisha    Daughter:  Tanner     Social Determinants of Health     Financial Resource Strain: Not on file   Food Insecurity: Low Risk  (7/18/2024)    Food Insecurity     Within the past 12 months, did you worry that your food would run out before you got money to buy more?: No     Within the past 12 months, did the food you bought just not last and you didn t have money to get more?: No   Transportation Needs: Not on file   Physical Activity: Not on file   Stress: Not on file   Social Connections: Not on file   Interpersonal Safety: Low Risk  (7/18/2024)    Interpersonal Safety     Do you feel physically and emotionally safe where you currently live?: Yes     Within the past 12 months, have you been hit, slapped, kicked or otherwise physically hurt by someone?: No     Within the past 12 months, have you been humiliated or emotionally abused in other ways by your partner or ex-partner?: No   Housing Stability: Not on file       Current Outpatient Medications   Medication Sig Dispense Refill    atorvastatin (LIPITOR) 40 MG tablet TAKE 1 TABLET BY MOUTH ONE TIME A DAY.      lisinopril (ZESTRIL) 30 MG tablet       metoprolol tartrate (LOPRESSOR) 100 MG tablet Take 100 mg by mouth      sildenafil (VIAGRA) 50 MG tablet Take  mg by mouth      triamterene-hydrochlorothiazide (MAXZIDE-25) 37.5-25 MG per tablet Take 1 tablet by mouth daily      diltiazem ER (DILT-XR) 240 MG 24  hr ER beaded capsule Take 240 mg by mouth      hydrOXYzine (ATARAX) 25 MG tablet Take 1 tablet (25 mg) by mouth every 4 hours as needed for anxiety (Patient not taking: Reported on 7/18/2024) 90 tablet 1    LORazepam (ATIVAN) 1 MG tablet Take 1 tablet (1 mg) by mouth 2 times daily as needed for anxiety (Patient not taking: Reported on 7/18/2024) 4 tablet 0     No current facility-administered medications for this visit.         ALLERGIES/SENSITIVITIES: No Known Allergies    PHYSICAL EXAM:     BP (!) 144/82   Pulse 87   Temp 98.3  F (36.8  C) (Tympanic)   Resp 19   Ht 1.829 m (6')   Wt 115.2 kg (254 lb)   SpO2 97%   BMI 34.45 kg/m      General Appearance:   Sitting up in bed, no apparent distress  HEENT: Pupils are equal and reactive, no scleral icterus   Heart & CV:  RRR, no murmur.  LUNGS: No increased work of breathing. Lungs are CTA B/L, no wheezing or crackles.  Abd:  soft, non-tender, no masses   Ext: no lower extremity edema   Neuro: alert and oriented, normal speech and mentation         CONSULTATION ASSESSMENT AND PLAN:    49 year old male with dysphagia.    The technical details of EGD with possible biopsy, possible dilation were discussed with the patient along with the risks and benefits to include bleeding, perforation and aspiration. Lance Vergara demonstrated understanding and is willing to proceed.       Brenton Nam MD on 7/18/2024 at 12:40 PM

## 2024-07-18 NOTE — H&P
GENERAL SURGERY CONSULTATION NOTE     Lance Vergara   [address]   [facility] [address]  49 year old  male    Primary Care Provider:  Sol Guadarrama       HPI: Lance Vergara presents to Clinic with subacute dysphagia.  Over the past few weeks patient has had progressive dysphagia.  This morning he was unable to swallow his saliva.  Also notes that he has not been able to burp.  States that other members in his family had this problem.  Patient is a drinker and does chewing tobacco, he recently quit.  Denies dark stools or weight loss.      REVIEW OF SYSTEMS:    GENERAL: No fevers or chills. Denies fatigue, recent weight loss.  HEENT: No sinus drainage. No changes with vision or hearing. No difficulty swallowing.   LYMPHATICS:  Noswollen nodes in axilla, neck or groin.  CARDIOVASCULAR: Denies chest pain, palpitations and dyspnea on exertion.  PULMONARY: No shortness of breath or cough. No increase in sputum production.  GI: Denies melena,bright red blood in stools. No hematemesis. No constipation or diarrhea.  : No dysuria or hematuria.  SKIN: No recent rashes or ulcers.   HEMATOLOGY:  No history of easy bruising or bleeding.  ENDOCRINE:  No history of diabetes or thyroid problems.  NEUROLOGY:  No history of seizures or headaches. No motor or sensory changes.               Patient Active Problem List   Diagnosis    HTN (hypertension)    Obesity    Hyperlipidemia    Paroxysmal A-fib (H)    Acute adjustment disorder with anxiety         Past Medical History        Past Medical History:   Diagnosis Date    Atrial fibrillation (H)       No Comments Provided    Essential hypertension       No Comments Provided    Obesity       No Comments Provided            Past Surgical History   History reviewed. No pertinent surgical history.        Family History   History reviewed. No pertinent family history.        Social History          Social History Narrative          Wife:  Latisha     Daughter:  Tanner          Social History   Social History            Socioeconomic History    Marital status:        Spouse name: Not on file    Number of children: Not on file    Years of education: Not on file    Highest education level: Not on file   Occupational History    Not on file   Tobacco Use    Smoking status: Never    Smokeless tobacco: Current       Types: Chew   Vaping Use    Vaping status: Never Used   Substance and Sexual Activity    Alcohol use: Not Currently       Comment: 2 weeks ago last drink    Drug use: Not Currently       Comment: Drug use: No    Sexual activity: Yes       Partners: Female   Other Topics Concern    Not on file   Social History Narrative          Wife:  Latisha     Daughter:  Tanner      Social Determinants of Health           Financial Resource Strain: Not on file   Food Insecurity: Low Risk  (7/18/2024)     Food Insecurity      Within the past 12 months, did you worry that your food would run out before you got money to buy more?: No      Within the past 12 months, did the food you bought just not last and you didn t have money to get more?: No   Transportation Needs: Not on file   Physical Activity: Not on file   Stress: Not on file   Social Connections: Not on file   Interpersonal Safety: Low Risk  (7/18/2024)     Interpersonal Safety      Do you feel physically and emotionally safe where you currently live?: Yes      Within the past 12 months, have you been hit, slapped, kicked or otherwise physically hurt by someone?: No      Within the past 12 months, have you been humiliated or emotionally abused in other ways by your partner or ex-partner?: No   Housing Stability: Not on file            Medications Ordered Prior to Encounter          Current Outpatient Medications   Medication Sig Dispense Refill    atorvastatin (LIPITOR) 40 MG tablet TAKE 1 TABLET BY MOUTH ONE TIME A DAY.        lisinopril (ZESTRIL) 30 MG tablet          metoprolol tartrate (LOPRESSOR) 100 MG tablet Take  100 mg by mouth        sildenafil (VIAGRA) 50 MG tablet Take  mg by mouth        triamterene-hydrochlorothiazide (MAXZIDE-25) 37.5-25 MG per tablet Take 1 tablet by mouth daily        diltiazem ER (DILT-XR) 240 MG 24 hr ER beaded capsule Take 240 mg by mouth        hydrOXYzine (ATARAX) 25 MG tablet Take 1 tablet (25 mg) by mouth every 4 hours as needed for anxiety (Patient not taking: Reported on 7/18/2024) 90 tablet 1    LORazepam (ATIVAN) 1 MG tablet Take 1 tablet (1 mg) by mouth 2 times daily as needed for anxiety (Patient not taking: Reported on 7/18/2024) 4 tablet 0      No current facility-administered medications for this visit.               ALLERGIES/SENSITIVITIES:   Allergies   No Known Allergies        PHYSICAL EXAM:      BP (!) 144/82   Pulse 87   Temp 98.3  F (36.8  C) (Tympanic)   Resp 19   Ht 1.829 m (6')   Wt 115.2 kg (254 lb)   SpO2 97%   BMI 34.45 kg/m      General Appearance:   Sitting up in bed, no apparent distress  HEENT: Pupils are equal and reactive, no scleral icterus   Heart & CV:  RRR, no murmur.  LUNGS: No increased work of breathing. Lungs are CTA B/L, no wheezing or crackles.  Abd:  soft, non-tender, no masses   Ext: no lower extremity edema   Neuro: alert and oriented, normal speech and mentation            CONSULTATION ASSESSMENT AND PLAN:    49 year old male with dysphagia.     The technical details of EGD with possible biopsy, possible dilation were discussed with the patient along with the risks and benefits to include bleeding, perforation and aspiration. Lance Vergara demonstrated understanding and is willing to proceed.        Brenton Nam MD on 7/18/2024 at 12:40 PM

## 2024-07-18 NOTE — TELEPHONE ENCOUNTER
Left message to call back....................  7/18/2024   3:53 PM  What pharmacy does he want it sent to?  Sia Ac LPN  7/18/2024  3:54 PM

## 2024-07-18 NOTE — TELEPHONE ENCOUNTER
Patient in day surgery today for EGD, due to difficulty swallowing,  and unable to eat for a few weeks. He is requesting ativan for the anxiety he has been having through this and difficulty sleeping. He took ativan in the past preoperatively for a cardioversion and it worked well for him and helped him to relax. Please contact patient if/when this request is completed.   Thank you,    Carly Henriquez RN on 7/18/2024 at 3:48 PM

## 2024-07-18 NOTE — OR NURSING
Lance has been discharged to home at 1600 via wc accompanied by wife    Written discharge instructions were provided to patient and wife.  Prescriptions were none.      Patient and adult caring for them verbalize understanding of discharge instructions including no driving until tomorrow and no longer taking narcotic pain medications - no operating mechanical equipment and no making any important decisions.They understand reason for discharge, and necessary follow-up appointments.

## 2024-07-18 NOTE — NURSING NOTE
"Chief Complaint   Patient presents with    Nausea    Vomiting    Fatigue     Patient here for nausea, vomiting, and insomnia/fatigue x2 weeks. He has \"no burp syndrome\".  He has not been keeping much food or fluids in. Vomit is clear. Patient's wife states that he is dealing with a lot of grief.     Initial BP (!) 144/82   Pulse 87   Temp 98.3  F (36.8  C) (Tympanic)   Resp 19   Ht 1.829 m (6')   Wt 115.2 kg (254 lb)   SpO2 97%   BMI 34.45 kg/m   Estimated body mass index is 34.45 kg/m  as calculated from the following:    Height as of this encounter: 1.829 m (6').    Weight as of this encounter: 115.2 kg (254 lb).  Medication Review: complete    The next two questions are to help us understand your food security.  If you are feeling you need any assistance in this area, we have resources available to support you today.          7/18/2024   SDOH- Food Insecurity   Within the past 12 months, did you worry that your food would run out before you got money to buy more? N   Within the past 12 months, did the food you bought just not last and you didn t have money to get more? N            Health Care Directive:  Patient does not have a Health Care Directive or Living Will: Discussed advance care planning with patient; however, patient declined at this time.    Shannen Shaw, LPN      "

## 2024-07-18 NOTE — OP NOTE
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 The patient presents with dysphagia.     PROCEDURE:The patient was taken to the endoscopy suite. Appropriate monitors were attached. The patient was placed in the left lateral decubitus position. Bite block was positioned.Timeout was performed confirming the patient's identity and procedure to be performed. After appropriate sedation was confirmed, the flexible endoscope was advanced into the oropharynx. The posterior oropharynx appeared grossly normal. The scope was advanced into the proximal esophagus. The esophagus was insufflated with air. The scope was advanced under direct visualization. No acute abnormalities of the esophagus were noted. The scope was advanced into the stomach. The scope was advanced through the pylorus into the duodenal bulb. The bulb and distal duodenum appeared grossly normal. Biopsies were taken from the duodenum with cold forceps. The scope was withdrawn back into the stomach. Antral biopsy was obtained and sent to pathology. Stomach antrum and body appeared mildly inflamed. The scope was retroflexed and the GE junction inspected. No abnormalities were noted. The scope was returned to a neutral position and the stomach was decompressed. The scope was withdrawn to the GE junction and biopsy obtained. The LES easily opened and allowed the scope through. No evidence of stricture, mass, mucosal irregularity. Possible narrowing at the distal esophagus to suggest possible early achalasia or nutcracker esophagus.  The mucosa of the esophagus was inspected while withdrawing thescope. No abnormalities were noted. The scope was withdrawn from the patient. The bite block was removed. The patient tolerated the procedure with no immediately apparent complication. The patient was taken to recovery instable condition.     ESTIMATED BLOOD LOSS: none    COMPLICATIONS:  None    TISSUE REMOVED:  Yes    RECOMMEND:    Referral for manometry     DEWAYNE Nam MD

## 2024-07-18 NOTE — ANESTHESIA PREPROCEDURE EVALUATION
Anesthesia Pre-Procedure Evaluation    Patient: Lance Vergara   MRN: [id number] : 1975        Procedure : Procedure(s):  ESOPHAGOGASTRODUODENOSCOPY, WITH DILATION          Past Medical History:   Diagnosis Date     Atrial fibrillation (H)     No Comments Provided     Essential hypertension     No Comments Provided     Obesity     No Comments Provided      History reviewed. No pertinent surgical history.   No Known Allergies   Social History     Tobacco Use     Smoking status: Never     Smokeless tobacco: Current     Types: Chew   Substance Use Topics     Alcohol use: Not Currently     Comment: 2 weeks ago last drink      Wt Readings from Last 1 Encounters:   24 115.2 kg (254 lb)        Anesthesia Evaluation   Pt has had prior anesthetic.     No history of anesthetic complications       ROS/MED HX  ENT/Pulmonary:       Neurologic:       Cardiovascular:     (+)  hypertension- -   -  - -                        dysrhythmias, a-fib,             METS/Exercise Tolerance: >4 METS    Hematologic:       Musculoskeletal:       GI/Hepatic: Comment: Dysphagia      Renal/Genitourinary:       Endo:     (+)               Obesity,       Psychiatric/Substance Use:       Infectious Disease:       Malignancy:       Other:          Physical Exam    Airway  airway exam normal      Mallampati: II   TM distance: > 3 FB   Neck ROM: full   Mouth opening: > 3 cm    Respiratory Devices and Support         Dental       (+) Minor Abnormalities - some fillings, tiny chips      Cardiovascular   cardiovascular exam normal       Rhythm and rate: regular and normal     Pulmonary   pulmonary exam normal        breath sounds clear to auscultation       OUTSIDE LABS:  CBC:   Lab Results   Component Value Date    WBC 11.5 (H) 2022    HGB 16.2 2022    HCT 47.2 2022     2022     BMP:   Lab Results   Component Value Date     2022    POTASSIUM 3.4 (L) 2022    CHLORIDE 100 2022    CO2 28  "04/29/2022    BUN 10 04/29/2022    CR 0.87 04/29/2022     (H) 04/29/2022     COAGS: No results found for: \"PTT\", \"INR\", \"FIBR\"  POC: No results found for: \"BGM\", \"HCG\", \"HCGS\"  HEPATIC: No results found for: \"ALBUMIN\", \"PROTTOTAL\", \"ALT\", \"AST\", \"GGT\", \"ALKPHOS\", \"BILITOTAL\", \"BILIDIRECT\", \"MECHE\"  OTHER:   Lab Results   Component Value Date    JUANITA 9.4 04/29/2022    MAG 2.0 04/29/2022    TSH 0.98 04/29/2022       Anesthesia Plan    ASA Status:  2    NPO Status:  ELEVATED Aspiration Risk/Unknown    Anesthesia Type: General.     - Airway: ETT   Induction: RSI.   Maintenance: Balanced.        Consents    Anesthesia Plan(s) and associated risks, benefits, and realistic alternatives discussed. Questions answered and patient/representative(s) expressed understanding.     - Discussed: Risks, Benefits and Alternatives for BOTH SEDATION and the PROCEDURE were discussed     - Discussed with:  Patient, Spouse      - Extended Intubation/Ventilatory Support Discussed: No.      - Patient is DNR/DNI Status: No     Use of blood products discussed: No .     Postoperative Care    Pain management: IV analgesics, Multi-modal analgesia.   PONV prophylaxis: Ondansetron (or other 5HT-3), Dexamethasone or Solumedrol     Comments:    Other Comments: Risks, benefits and alternatives discussed and would like to proceed.              ABI Mittal CRNA    I have reviewed the pertinent notes and labs in the chart from the past 30 days and (re)examined the patient.  Any updates or changes from those notes are reflected in this note.              # Obesity: Estimated body mass index is 34.44 kg/m  as calculated from the following:    Height as of this encounter: 1.829 m (6' 0.01\").    Weight as of this encounter: 115.2 kg (254 lb).      "

## 2024-07-18 NOTE — ANESTHESIA PROCEDURE NOTES
Airway         Procedure Start/Stop Times: 7/18/2024 1:57 PM  Staff -        CRNA: Last Valentino APRN CRNA       Performed By: CRNAIndications and Patient Condition       Indications for airway management: keisha-procedural       Induction type:RSI       Mask difficulty assessment: 0 - not attempted    Final Airway Details       Final airway type: endotracheal airway       Successful airway: ETT - single  Endotracheal Airway Details        ETT size (mm): 7.0       Cuffed: yes       Successful intubation technique: video laryngoscopy       VL Blade Size: Glidescope 4       Grade View of Cords: 2       Adjucts: stylet       Position: Right       Measured from: lips       Secured at (cm): 23       Bite block used: None    Post intubation assessment        Placement verified by: capnometry, equal breath sounds and chest rise        Number of attempts at approach: 1       Number of other approaches attempted: 0       Secured with: tape       Ease of procedure: easy       Dentition: Intact and Unchanged    Medication(s) Administered   Medication Administration Time: 7/18/2024 1:57 PM

## 2024-07-18 NOTE — ANESTHESIA POSTPROCEDURE EVALUATION
Patient: Lance Vergara    Procedure: Procedure(s):  ESOPHAGOGASTRODUODENOSCOPY, WITH BIOPSY       Anesthesia Type:  General    Note:  Disposition: Outpatient   Postop Pain Control: Uneventful            Sign Out: Well controlled pain   PONV: No   Neuro/Psych: Uneventful            Sign Out: Acceptable/Baseline neuro status   Airway/Respiratory: Uneventful            Sign Out: AIRWAY IN SITU/Resp. Support   CV/Hemodynamics: Uneventful            Sign Out: Acceptable CV status; No obvious hypovolemia; No obvious fluid overload   Other NRE: NONE   DID A NON-ROUTINE EVENT OCCUR?     Event details/Postop Comments:  Pt needed extra IV fluids due to not able to keep food or drink down for 3 days.             Last vitals:  Vitals Value Taken Time   BP 88/48 07/18/24 1440   Temp 97.9  F (36.6  C) 07/18/24 1435   Pulse 89 07/18/24 1442   Resp 14 07/18/24 1442   SpO2 92 % 07/18/24 1443   Vitals shown include unfiled device data.    Electronically Signed By: ABI LAMBERT CRNA  July 18, 2024  2:58 PM

## 2024-07-18 NOTE — OR NURSING
PACU Transfer Note    Lance Vergara was transferred to *** via ***.  Equipment used for transport:  ***.  Accompanied by:  ***  Prescriptions were: ***    PACU Respiratory Event Documentation     1) Episodes of Apnea greater than or equal to 10 seconds: ***    2) Bradypnea - less than 8 breaths per minute: ***    3) Pain score on 0 to 10 scale: ***    4) Pain-sedation mismatch (yes or no): ***    5) Repeated 02 desaturation less than 90% (yes or no): ***    Anesthesia notified? (yes or no): ***    Any of the above events occuring repeatedly in separate 30 minute intervals may be considered recurrent PACU respiratory events.    Patient stable and meets phase 1 discharge criteria for transport from PACU.

## 2024-07-18 NOTE — ANESTHESIA CARE TRANSFER NOTE
Patient: Lance Vergara    Procedure: Procedure(s):  ESOPHAGOGASTRODUODENOSCOPY, WITH BIOPSY       Diagnosis: Dysphagia, unspecified type [R13.10]  Diagnosis Additional Information: No value filed.    Anesthesia Type:   General     Note:    Oropharynx: oropharynx clear of all foreign objects  Level of Consciousness: awake  Oxygen Supplementation: face mask  Level of Supplemental Oxygen (L/min / FiO2): 8  Independent Airway: airway patency satisfactory and stable  Dentition: dentition unchanged  Vital Signs Stable: post-procedure vital signs reviewed and stable  Report to RN Given: handoff report given  Patient transferred to: PACU    Handoff Report: Identifed the Patient, Identified the Reponsible Provider, Reviewed the pertinent medical history, Discussed the surgical course, Reviewed Intra-OP anesthesia mangement and issues during anesthesia, Set expectations for post-procedure period and Allowed opportunity for questions and acknowledgement of understanding  Vitals:  Vitals Value Taken Time   BP     Temp     Pulse     Resp     SpO2         Electronically Signed By: ABI Mittal CRNA  July 18, 2024  2:11 PM

## 2024-07-22 NOTE — TELEPHONE ENCOUNTER
Patient could request this from his surgeon.   Hasn't seen Sol Christianson MD since 2013.  Sol Christianson MD

## 2024-07-22 NOTE — TELEPHONE ENCOUNTER
Returned patient call; patient states that he uses [facility].    Jyoti Agudelo LPN on 7/22/2024 at 4:17 PM

## 2024-07-22 NOTE — TELEPHONE ENCOUNTER
EGD has been completed already. Medication not needed.     Callie Lopez, KYLE on 7/22/2024 at 4:47 PM

## 2024-07-23 LAB
PATH REPORT.COMMENTS IMP SPEC: NORMAL
PATH REPORT.FINAL DX SPEC: NORMAL
PATH REPORT.RELEVANT HX SPEC: NORMAL
PHOTO IMAGE: NORMAL

## 2024-08-19 ENCOUNTER — TRANSFERRED RECORDS (OUTPATIENT)
Dept: FAMILY MEDICINE | Facility: OTHER | Age: 49
End: 2024-08-19
Payer: COMMERCIAL

## 2024-08-19 VITALS — DIASTOLIC BLOOD PRESSURE: 49 MMHG | SYSTOLIC BLOOD PRESSURE: 101 MMHG

## 2024-08-19 NOTE — PROGRESS NOTES
Patient Quality Outreach    Patient is due for the following:   Hypertension -  BP check    Next Steps:   No follow up needed at this time.    Type of outreach:    Chart review performed, no outreach needed. Blood pressure of 101/49 taken at last EGD. Flowsheets updated.    Questions for provider review:    None           Lorna Recinos RN

## 2025-02-01 ENCOUNTER — HOSPITAL ENCOUNTER (INPATIENT)
Facility: OTHER | Age: 50
LOS: 2 days | Discharge: HOME | DRG: 309 | End: 2025-02-03
Attending: STUDENT IN AN ORGANIZED HEALTH CARE EDUCATION/TRAINING PROGRAM | Admitting: INTERNAL MEDICINE
Payer: COMMERCIAL

## 2025-02-01 DIAGNOSIS — E87.6 HYPOKALEMIA: ICD-10-CM

## 2025-02-01 DIAGNOSIS — K70.10 ACUTE ALCOHOLIC HEPATITIS (H): ICD-10-CM

## 2025-02-01 DIAGNOSIS — N17.9 ACUTE KIDNEY INJURY: ICD-10-CM

## 2025-02-01 DIAGNOSIS — R63.8 DECREASED ORAL INTAKE: ICD-10-CM

## 2025-02-01 DIAGNOSIS — I48.91 ATRIAL FIBRILLATION WITH RAPID VENTRICULAR RESPONSE (H): ICD-10-CM

## 2025-02-01 DIAGNOSIS — F10.90 ALCOHOL USE DISORDER: ICD-10-CM

## 2025-02-01 DIAGNOSIS — I48.0 PAROXYSMAL A-FIB (H): Primary | ICD-10-CM

## 2025-02-01 PROBLEM — R14.0 ABDOMINAL BLOATING: Status: ACTIVE | Noted: 2025-02-01

## 2025-02-01 PROBLEM — K29.20 CHRONIC ALCOHOLIC GASTRITIS WITHOUT HEMORRHAGE: Status: ACTIVE | Noted: 2025-02-01

## 2025-02-01 PROBLEM — K70.0 ALCOHOLIC FATTY LIVER: Status: ACTIVE | Noted: 2025-02-01

## 2025-02-01 PROBLEM — K59.09 OTHER CONSTIPATION: Status: ACTIVE | Noted: 2025-02-01

## 2025-02-01 LAB
ALBUMIN SERPL BCG-MCNC: 4.3 G/DL (ref 3.5–5.2)
ALP SERPL-CCNC: 112 U/L (ref 40–150)
ALT SERPL W P-5'-P-CCNC: 134 U/L (ref 0–70)
AMYLASE SERPL-CCNC: 98 U/L (ref 28–100)
ANION GAP SERPL CALCULATED.3IONS-SCNC: 22 MMOL/L (ref 7–15)
AST SERPL W P-5'-P-CCNC: 221 U/L (ref 0–45)
ATRIAL RATE - MUSE: 138 BPM
ATRIAL RATE - MUSE: 357 BPM
BASOPHILS # BLD AUTO: 0.1 10E3/UL (ref 0–0.2)
BASOPHILS NFR BLD AUTO: 1 %
BILIRUB SERPL-MCNC: 2 MG/DL
BUN SERPL-MCNC: 43.5 MG/DL (ref 6–20)
CALCIUM SERPL-MCNC: 9.9 MG/DL (ref 8.8–10.4)
CHLORIDE SERPL-SCNC: 90 MMOL/L (ref 98–107)
CREAT SERPL-MCNC: 3.06 MG/DL (ref 0.67–1.17)
DIASTOLIC BLOOD PRESSURE - MUSE: NORMAL MMHG
DIASTOLIC BLOOD PRESSURE - MUSE: NORMAL MMHG
EGFRCR SERPLBLD CKD-EPI 2021: 24 ML/MIN/1.73M2
EOSINOPHIL # BLD AUTO: 0.1 10E3/UL (ref 0–0.7)
EOSINOPHIL NFR BLD AUTO: 2 %
ERYTHROCYTE [DISTWIDTH] IN BLOOD BY AUTOMATED COUNT: 14.7 % (ref 10–15)
GLUCOSE SERPL-MCNC: 217 MG/DL (ref 70–99)
HCO3 SERPL-SCNC: 20 MMOL/L (ref 22–29)
HCT VFR BLD AUTO: 39.5 % (ref 40–53)
HGB BLD-MCNC: 14.4 G/DL (ref 13.3–17.7)
HOLD SPECIMEN: NORMAL
IMM GRANULOCYTES # BLD: 0 10E3/UL
IMM GRANULOCYTES NFR BLD: 0 %
INR PPP: 1.25 (ref 0.85–1.15)
INTERPRETATION ECG - MUSE: NORMAL
INTERPRETATION ECG - MUSE: NORMAL
LIPASE SERPL-CCNC: 109 U/L (ref 13–60)
LYMPHOCYTES # BLD AUTO: 3 10E3/UL (ref 0.8–5.3)
LYMPHOCYTES NFR BLD AUTO: 37 %
MAGNESIUM SERPL-MCNC: 2 MG/DL (ref 1.7–2.3)
MAGNESIUM SERPL-MCNC: 2.2 MG/DL (ref 1.7–2.3)
MAGNESIUM SERPL-MCNC: 2.2 MG/DL (ref 1.7–2.3)
MCH RBC QN AUTO: 34.7 PG (ref 26.5–33)
MCHC RBC AUTO-ENTMCNC: 36.5 G/DL (ref 31.5–36.5)
MCV RBC AUTO: 95 FL (ref 78–100)
MONOCYTES # BLD AUTO: 0.7 10E3/UL (ref 0–1.3)
MONOCYTES NFR BLD AUTO: 8 %
NEUTROPHILS # BLD AUTO: 4.2 10E3/UL (ref 1.6–8.3)
NEUTROPHILS NFR BLD AUTO: 52 %
NRBC # BLD AUTO: 0 10E3/UL
NRBC BLD AUTO-RTO: 0 /100
P AXIS - MUSE: NORMAL DEGREES
P AXIS - MUSE: NORMAL DEGREES
PHOSPHATE SERPL-MCNC: 1.9 MG/DL (ref 2.5–4.5)
PHOSPHATE SERPL-MCNC: 2.1 MG/DL (ref 2.5–4.5)
PLATELET # BLD AUTO: 232 10E3/UL (ref 150–450)
POTASSIUM SERPL-SCNC: 3.2 MMOL/L (ref 3.4–5.3)
POTASSIUM SERPL-SCNC: 3.8 MMOL/L (ref 3.4–5.3)
PR INTERVAL - MUSE: NORMAL MS
PR INTERVAL - MUSE: NORMAL MS
PROT SERPL-MCNC: 8.5 G/DL (ref 6.4–8.3)
QRS DURATION - MUSE: 82 MS
QRS DURATION - MUSE: 98 MS
QT - MUSE: 300 MS
QT - MUSE: 392 MS
QTC - MUSE: 454 MS
QTC - MUSE: 466 MS
R AXIS - MUSE: -4 DEGREES
R AXIS - MUSE: -8 DEGREES
RBC # BLD AUTO: 4.15 10E6/UL (ref 4.4–5.9)
SODIUM SERPL-SCNC: 132 MMOL/L (ref 135–145)
SYSTOLIC BLOOD PRESSURE - MUSE: NORMAL MMHG
SYSTOLIC BLOOD PRESSURE - MUSE: NORMAL MMHG
T AXIS - MUSE: 11 DEGREES
T AXIS - MUSE: 190 DEGREES
TROPONIN T SERPL HS-MCNC: 12 NG/L
TSH SERPL DL<=0.005 MIU/L-ACNC: 1.95 UIU/ML (ref 0.3–4.2)
VENTRICULAR RATE- MUSE: 138 BPM
VENTRICULAR RATE- MUSE: 85 BPM
WBC # BLD AUTO: 8.1 10E3/UL (ref 4–11)

## 2025-02-01 PROCEDURE — 86665 EPSTEIN-BARR CAPSID VCA: CPT | Performed by: INTERNAL MEDICINE

## 2025-02-01 PROCEDURE — 96375 TX/PRO/DX INJ NEW DRUG ADDON: CPT | Performed by: STUDENT IN AN ORGANIZED HEALTH CARE EDUCATION/TRAINING PROGRAM

## 2025-02-01 PROCEDURE — 258N000003 HC RX IP 258 OP 636: Performed by: INTERNAL MEDICINE

## 2025-02-01 PROCEDURE — 84100 ASSAY OF PHOSPHORUS: CPT | Performed by: INTERNAL MEDICINE

## 2025-02-01 PROCEDURE — 83735 ASSAY OF MAGNESIUM: CPT | Performed by: SURGERY

## 2025-02-01 PROCEDURE — 250N000013 HC RX MED GY IP 250 OP 250 PS 637: Performed by: SURGERY

## 2025-02-01 PROCEDURE — 200N000001 HC R&B ICU

## 2025-02-01 PROCEDURE — 84100 ASSAY OF PHOSPHORUS: CPT | Performed by: SURGERY

## 2025-02-01 PROCEDURE — 86790 VIRUS ANTIBODY NOS: CPT | Performed by: INTERNAL MEDICINE

## 2025-02-01 PROCEDURE — 36415 COLL VENOUS BLD VENIPUNCTURE: CPT | Performed by: SURGERY

## 2025-02-01 PROCEDURE — 87340 HEPATITIS B SURFACE AG IA: CPT | Performed by: INTERNAL MEDICINE

## 2025-02-01 PROCEDURE — 87389 HIV-1 AG W/HIV-1&-2 AB AG IA: CPT | Performed by: INTERNAL MEDICINE

## 2025-02-01 PROCEDURE — 86645 CMV ANTIBODY IGM: CPT | Performed by: INTERNAL MEDICINE

## 2025-02-01 PROCEDURE — 99223 1ST HOSP IP/OBS HIGH 75: CPT | Performed by: INTERNAL MEDICINE

## 2025-02-01 PROCEDURE — 83690 ASSAY OF LIPASE: CPT | Performed by: INTERNAL MEDICINE

## 2025-02-01 PROCEDURE — 80053 COMPREHEN METABOLIC PANEL: CPT | Performed by: STUDENT IN AN ORGANIZED HEALTH CARE EDUCATION/TRAINING PROGRAM

## 2025-02-01 PROCEDURE — 258N000003 HC RX IP 258 OP 636: Performed by: SURGERY

## 2025-02-01 PROCEDURE — 96374 THER/PROPH/DIAG INJ IV PUSH: CPT | Performed by: STUDENT IN AN ORGANIZED HEALTH CARE EDUCATION/TRAINING PROGRAM

## 2025-02-01 PROCEDURE — 83735 ASSAY OF MAGNESIUM: CPT | Performed by: STUDENT IN AN ORGANIZED HEALTH CARE EDUCATION/TRAINING PROGRAM

## 2025-02-01 PROCEDURE — 250N000011 HC RX IP 250 OP 636: Mod: JZ | Performed by: STUDENT IN AN ORGANIZED HEALTH CARE EDUCATION/TRAINING PROGRAM

## 2025-02-01 PROCEDURE — 84132 ASSAY OF SERUM POTASSIUM: CPT | Performed by: SURGERY

## 2025-02-01 PROCEDURE — 85610 PROTHROMBIN TIME: CPT | Performed by: INTERNAL MEDICINE

## 2025-02-01 PROCEDURE — 258N000003 HC RX IP 258 OP 636: Performed by: STUDENT IN AN ORGANIZED HEALTH CARE EDUCATION/TRAINING PROGRAM

## 2025-02-01 PROCEDURE — 36415 COLL VENOUS BLD VENIPUNCTURE: CPT | Performed by: INTERNAL MEDICINE

## 2025-02-01 PROCEDURE — 99291 CRITICAL CARE FIRST HOUR: CPT | Mod: 25 | Performed by: STUDENT IN AN ORGANIZED HEALTH CARE EDUCATION/TRAINING PROGRAM

## 2025-02-01 PROCEDURE — 83735 ASSAY OF MAGNESIUM: CPT | Performed by: INTERNAL MEDICINE

## 2025-02-01 PROCEDURE — 82150 ASSAY OF AMYLASE: CPT | Performed by: INTERNAL MEDICINE

## 2025-02-01 PROCEDURE — 84443 ASSAY THYROID STIM HORMONE: CPT | Performed by: STUDENT IN AN ORGANIZED HEALTH CARE EDUCATION/TRAINING PROGRAM

## 2025-02-01 PROCEDURE — 99291 CRITICAL CARE FIRST HOUR: CPT | Performed by: STUDENT IN AN ORGANIZED HEALTH CARE EDUCATION/TRAINING PROGRAM

## 2025-02-01 PROCEDURE — 93005 ELECTROCARDIOGRAM TRACING: CPT | Mod: 76 | Performed by: STUDENT IN AN ORGANIZED HEALTH CARE EDUCATION/TRAINING PROGRAM

## 2025-02-01 PROCEDURE — 84484 ASSAY OF TROPONIN QUANT: CPT | Performed by: STUDENT IN AN ORGANIZED HEALTH CARE EDUCATION/TRAINING PROGRAM

## 2025-02-01 PROCEDURE — 93010 ELECTROCARDIOGRAM REPORT: CPT | Performed by: INTERNAL MEDICINE

## 2025-02-01 PROCEDURE — 86706 HEP B SURFACE ANTIBODY: CPT | Performed by: INTERNAL MEDICINE

## 2025-02-01 PROCEDURE — 86709 HEPATITIS A IGM ANTIBODY: CPT | Performed by: INTERNAL MEDICINE

## 2025-02-01 PROCEDURE — 250N000013 HC RX MED GY IP 250 OP 250 PS 637: Performed by: INTERNAL MEDICINE

## 2025-02-01 PROCEDURE — 85025 COMPLETE CBC W/AUTO DIFF WBC: CPT | Performed by: STUDENT IN AN ORGANIZED HEALTH CARE EDUCATION/TRAINING PROGRAM

## 2025-02-01 PROCEDURE — 93005 ELECTROCARDIOGRAM TRACING: CPT | Performed by: STUDENT IN AN ORGANIZED HEALTH CARE EDUCATION/TRAINING PROGRAM

## 2025-02-01 PROCEDURE — 250N000013 HC RX MED GY IP 250 OP 250 PS 637: Performed by: STUDENT IN AN ORGANIZED HEALTH CARE EDUCATION/TRAINING PROGRAM

## 2025-02-01 PROCEDURE — 86704 HEP B CORE ANTIBODY TOTAL: CPT | Performed by: INTERNAL MEDICINE

## 2025-02-01 PROCEDURE — 36415 COLL VENOUS BLD VENIPUNCTURE: CPT | Performed by: STUDENT IN AN ORGANIZED HEALTH CARE EDUCATION/TRAINING PROGRAM

## 2025-02-01 PROCEDURE — 86644 CMV ANTIBODY: CPT | Performed by: INTERNAL MEDICINE

## 2025-02-01 PROCEDURE — 86803 HEPATITIS C AB TEST: CPT | Performed by: INTERNAL MEDICINE

## 2025-02-01 RX ORDER — ONDANSETRON 2 MG/ML
4 INJECTION INTRAMUSCULAR; INTRAVENOUS EVERY 6 HOURS PRN
Status: DISCONTINUED | OUTPATIENT
Start: 2025-02-01 | End: 2025-02-03 | Stop reason: HOSPADM

## 2025-02-01 RX ORDER — OLANZAPINE 5 MG/1
5-10 TABLET, ORALLY DISINTEGRATING ORAL EVERY 6 HOURS PRN
Status: DISCONTINUED | OUTPATIENT
Start: 2025-02-01 | End: 2025-02-03 | Stop reason: HOSPADM

## 2025-02-01 RX ORDER — LORAZEPAM 2 MG/ML
1 INJECTION INTRAMUSCULAR ONCE
Status: COMPLETED | OUTPATIENT
Start: 2025-02-01 | End: 2025-02-01

## 2025-02-01 RX ORDER — ONDANSETRON 4 MG/1
4 TABLET, ORALLY DISINTEGRATING ORAL EVERY 6 HOURS PRN
Status: DISCONTINUED | OUTPATIENT
Start: 2025-02-01 | End: 2025-02-03 | Stop reason: HOSPADM

## 2025-02-01 RX ORDER — CALCIUM GLUCONATE 94 MG/ML
2 INJECTION, SOLUTION INTRAVENOUS ONCE
Status: COMPLETED | OUTPATIENT
Start: 2025-02-01 | End: 2025-02-01

## 2025-02-01 RX ORDER — ASPIRIN 81 MG/1
81 TABLET ORAL DAILY
COMMUNITY

## 2025-02-01 RX ORDER — FAMOTIDINE 20 MG/1
20 TABLET, FILM COATED ORAL 2 TIMES DAILY PRN
Status: DISCONTINUED | OUTPATIENT
Start: 2025-02-01 | End: 2025-02-03 | Stop reason: HOSPADM

## 2025-02-01 RX ORDER — POTASSIUM CHLORIDE 1500 MG/1
40 TABLET, EXTENDED RELEASE ORAL ONCE
Status: COMPLETED | OUTPATIENT
Start: 2025-02-01 | End: 2025-02-01

## 2025-02-01 RX ORDER — DILTIAZEM HYDROCHLORIDE 30 MG/1
30 TABLET, FILM COATED ORAL EVERY 6 HOURS SCHEDULED
Status: DISCONTINUED | OUTPATIENT
Start: 2025-02-01 | End: 2025-02-02

## 2025-02-01 RX ORDER — LORAZEPAM 2 MG/ML
1-2 INJECTION INTRAMUSCULAR EVERY 30 MIN PRN
Status: DISCONTINUED | OUTPATIENT
Start: 2025-02-01 | End: 2025-02-03 | Stop reason: HOSPADM

## 2025-02-01 RX ORDER — MULTIPLE VITAMINS W/ MINERALS TAB 9MG-400MCG
1 TAB ORAL DAILY
Status: DISCONTINUED | OUTPATIENT
Start: 2025-02-01 | End: 2025-02-03 | Stop reason: HOSPADM

## 2025-02-01 RX ORDER — ASPIRIN 81 MG/1
81 TABLET ORAL DAILY
Status: DISCONTINUED | OUTPATIENT
Start: 2025-02-02 | End: 2025-02-03 | Stop reason: HOSPADM

## 2025-02-01 RX ORDER — LISINOPRIL 40 MG/1
1 TABLET ORAL DAILY
Status: ON HOLD | COMMUNITY
Start: 2025-01-24 | End: 2025-02-03

## 2025-02-01 RX ORDER — ATORVASTATIN CALCIUM 40 MG/1
40 TABLET, FILM COATED ORAL DAILY
Status: DISCONTINUED | OUTPATIENT
Start: 2025-02-02 | End: 2025-02-03 | Stop reason: HOSPADM

## 2025-02-01 RX ORDER — DILTIAZEM HCL/D5W 125 MG/125
5-15 PLASTIC BAG, INJECTION (ML) INTRAVENOUS CONTINUOUS
Status: DISCONTINUED | OUTPATIENT
Start: 2025-02-01 | End: 2025-02-02

## 2025-02-01 RX ORDER — METOPROLOL TARTRATE 50 MG
50 TABLET ORAL 2 TIMES DAILY
Status: DISCONTINUED | OUTPATIENT
Start: 2025-02-01 | End: 2025-02-02

## 2025-02-01 RX ORDER — CLONIDINE HYDROCHLORIDE 0.1 MG/1
0.1 TABLET ORAL 3 TIMES DAILY
Status: DISCONTINUED | OUTPATIENT
Start: 2025-02-01 | End: 2025-02-03 | Stop reason: HOSPADM

## 2025-02-01 RX ORDER — PANTOPRAZOLE SODIUM 40 MG/1
40 TABLET, DELAYED RELEASE ORAL DAILY
Status: DISCONTINUED | OUTPATIENT
Start: 2025-02-02 | End: 2025-02-03 | Stop reason: HOSPADM

## 2025-02-01 RX ORDER — HALOPERIDOL 5 MG/ML
2.5-5 INJECTION INTRAMUSCULAR EVERY 6 HOURS PRN
Status: DISCONTINUED | OUTPATIENT
Start: 2025-02-01 | End: 2025-02-03 | Stop reason: HOSPADM

## 2025-02-01 RX ORDER — METOPROLOL TARTRATE 100 MG/1
100 TABLET ORAL 2 TIMES DAILY
Status: DISCONTINUED | OUTPATIENT
Start: 2025-02-01 | End: 2025-02-01

## 2025-02-01 RX ORDER — AMOXICILLIN 250 MG
2 CAPSULE ORAL 2 TIMES DAILY PRN
Status: DISCONTINUED | OUTPATIENT
Start: 2025-02-01 | End: 2025-02-03 | Stop reason: HOSPADM

## 2025-02-01 RX ORDER — BISACODYL 10 MG
10 SUPPOSITORY, RECTAL RECTAL DAILY PRN
Status: DISCONTINUED | OUTPATIENT
Start: 2025-02-01 | End: 2025-02-03 | Stop reason: HOSPADM

## 2025-02-01 RX ORDER — AMOXICILLIN 250 MG
1 CAPSULE ORAL 2 TIMES DAILY PRN
Status: DISCONTINUED | OUTPATIENT
Start: 2025-02-01 | End: 2025-02-03 | Stop reason: HOSPADM

## 2025-02-01 RX ORDER — FLUMAZENIL 0.1 MG/ML
0.2 INJECTION, SOLUTION INTRAVENOUS
Status: DISCONTINUED | OUTPATIENT
Start: 2025-02-01 | End: 2025-02-03 | Stop reason: HOSPADM

## 2025-02-01 RX ORDER — SUCRALFATE 1 G/1
1 TABLET ORAL
Status: DISCONTINUED | OUTPATIENT
Start: 2025-02-01 | End: 2025-02-03 | Stop reason: HOSPADM

## 2025-02-01 RX ORDER — CALCIUM CARBONATE 500 MG/1
1000 TABLET, CHEWABLE ORAL 4 TIMES DAILY PRN
Status: DISCONTINUED | OUTPATIENT
Start: 2025-02-01 | End: 2025-02-03 | Stop reason: HOSPADM

## 2025-02-01 RX ORDER — LORAZEPAM 1 MG/1
1-2 TABLET ORAL EVERY 30 MIN PRN
Status: DISCONTINUED | OUTPATIENT
Start: 2025-02-01 | End: 2025-02-03 | Stop reason: HOSPADM

## 2025-02-01 RX ORDER — POLYETHYLENE GLYCOL 3350 17 G/17G
17 POWDER, FOR SOLUTION ORAL 2 TIMES DAILY PRN
Status: DISCONTINUED | OUTPATIENT
Start: 2025-02-01 | End: 2025-02-03 | Stop reason: HOSPADM

## 2025-02-01 RX ORDER — FOLIC ACID 1 MG/1
1 TABLET ORAL DAILY
Status: DISCONTINUED | OUTPATIENT
Start: 2025-02-01 | End: 2025-02-03 | Stop reason: HOSPADM

## 2025-02-01 RX ORDER — SODIUM CHLORIDE 9 MG/ML
INJECTION, SOLUTION INTRAVENOUS CONTINUOUS
Status: DISCONTINUED | OUTPATIENT
Start: 2025-02-01 | End: 2025-02-03 | Stop reason: HOSPADM

## 2025-02-01 RX ORDER — ROPIVACAINE IN 0.9% SOD CHL/PF 0.1 %
.01-.125 PLASTIC BAG, INJECTION (ML) EPIDURAL CONTINUOUS
Status: DISCONTINUED | OUTPATIENT
Start: 2025-02-01 | End: 2025-02-02

## 2025-02-01 RX ORDER — LIDOCAINE 40 MG/G
CREAM TOPICAL
Status: DISCONTINUED | OUTPATIENT
Start: 2025-02-01 | End: 2025-02-03 | Stop reason: HOSPADM

## 2025-02-01 RX ADMIN — Medication 5 MG/HR: at 11:22

## 2025-02-01 RX ADMIN — FOLIC ACID 1 MG: 1 TABLET ORAL at 15:32

## 2025-02-01 RX ADMIN — SODIUM CHLORIDE: 0.9 INJECTION, SOLUTION INTRAVENOUS at 22:28

## 2025-02-01 RX ADMIN — SODIUM CHLORIDE 1000 ML: 0.9 INJECTION, SOLUTION INTRAVENOUS at 15:16

## 2025-02-01 RX ADMIN — METOPROLOL TARTRATE 50 MG: 50 TABLET, FILM COATED ORAL at 21:36

## 2025-02-01 RX ADMIN — THIAMINE HCL TAB 100 MG 100 MG: 100 TAB at 15:32

## 2025-02-01 RX ADMIN — SODIUM CHLORIDE 1000 ML: 0.9 INJECTION, SOLUTION INTRAVENOUS at 11:50

## 2025-02-01 RX ADMIN — CALCIUM GLUCONATE 2 G: 98 INJECTION, SOLUTION INTRAVENOUS at 12:17

## 2025-02-01 RX ADMIN — LORAZEPAM 1 MG: 2 INJECTION INTRAMUSCULAR; INTRAVENOUS at 11:58

## 2025-02-01 RX ADMIN — SODIUM CHLORIDE 500 ML: 0.9 INJECTION, SOLUTION INTRAVENOUS at 23:40

## 2025-02-01 RX ADMIN — SUCRALFATE 1 G: 1 TABLET ORAL at 17:24

## 2025-02-01 RX ADMIN — POTASSIUM CHLORIDE 40 MEQ: 1500 TABLET, EXTENDED RELEASE ORAL at 11:57

## 2025-02-01 RX ADMIN — Medication 1 PACKET: at 21:37

## 2025-02-01 RX ADMIN — MELATONIN 6 MG: 3 TAB ORAL at 19:30

## 2025-02-01 RX ADMIN — Medication 1 TABLET: at 15:32

## 2025-02-01 RX ADMIN — SODIUM CHLORIDE: 0.9 INJECTION, SOLUTION INTRAVENOUS at 17:24

## 2025-02-01 RX ADMIN — SODIUM CHLORIDE 1000 ML: 0.9 INJECTION, SOLUTION INTRAVENOUS at 12:09

## 2025-02-01 RX ADMIN — SODIUM CHLORIDE 500 ML: 0.9 INJECTION, SOLUTION INTRAVENOUS at 20:28

## 2025-02-01 RX ADMIN — SUCRALFATE 1 G: 1 TABLET ORAL at 21:36

## 2025-02-01 RX ADMIN — DILTIAZEM HYDROCHLORIDE 30 MG: 30 TABLET, FILM COATED ORAL at 18:16

## 2025-02-01 ASSESSMENT — COLUMBIA-SUICIDE SEVERITY RATING SCALE - C-SSRS
2. HAVE YOU ACTUALLY HAD ANY THOUGHTS OF KILLING YOURSELF IN THE PAST MONTH?: NO
6. HAVE YOU EVER DONE ANYTHING, STARTED TO DO ANYTHING, OR PREPARED TO DO ANYTHING TO END YOUR LIFE?: NO
1. IN THE PAST MONTH, HAVE YOU WISHED YOU WERE DEAD OR WISHED YOU COULD GO TO SLEEP AND NOT WAKE UP?: NO

## 2025-02-01 ASSESSMENT — ACTIVITIES OF DAILY LIVING (ADL)
ADLS_ACUITY_SCORE: 24
ADLS_ACUITY_SCORE: 41
ADLS_ACUITY_SCORE: 24
ADLS_ACUITY_SCORE: 41
ADLS_ACUITY_SCORE: 23
ADLS_ACUITY_SCORE: 23
ADLS_ACUITY_SCORE: 41
ADLS_ACUITY_SCORE: 23
ADLS_ACUITY_SCORE: 23

## 2025-02-01 NOTE — PHARMACY-ADMISSION MEDICATION HISTORY
Pharmacist Admission Medication History    Admission medication history is complete. The information provided in this note is only as accurate as the sources available at the time of the update.    Information Source(s): Patient, Family member, Hospital records, and CareEverywhere/SureScripts via in-person    Pertinent Information:  -Patient started taking Eliquis 2 days ago upon feeling a fib start. He was given #20 tablets by his cardiologist and has been using these. Took a dose this AM and has approx 15 tablets remaining.  -Nexium use is consistent- purchases OTC.    Changes made to PTA medication list:  Added:  ASA 81 mg  Eliquis  Esomprazole  Deleted:  Diltiazem  Hydroxyzine  Lorazepam  Changed:  Sig on metoprolol from unspecified -> BID  Lisinopril from 30 mg -> 40 mg + added sig    Allergies reviewed with patient and updates made in EHR: yes    Medication History Completed By: Alma Jewell RPH 2/1/2025 1:21 PM    PTA Med List   Medication Sig Last Dose/Taking    apixaban ANTICOAGULANT (ELIQUIS) 5 MG tablet Take 5 mg by mouth 2 times daily. 2/1/2025 Morning    aspirin 81 MG EC tablet Take 81 mg by mouth daily. 2/1/2025 Morning    atorvastatin (LIPITOR) 40 MG tablet Take 40 mg by mouth daily. 2/1/2025 Morning    esomeprazole (NEXIUM) 20 MG DR capsule Take 20 mg by mouth daily as needed (heartburn). Take 30-60 minutes before eating. 2/1/2025 Morning    lisinopril (ZESTRIL) 40 MG tablet Take 1 tablet by mouth daily. 2/1/2025 Morning    metoprolol tartrate (LOPRESSOR) 100 MG tablet Take 100 mg by mouth 2 times daily. 2/1/2025 Morning    sildenafil (VIAGRA) 50 MG tablet Take  mg by mouth daily as needed (prior to sexual activity). More than a month    triamterene-hydrochlorothiazide (MAXZIDE-25) 37.5-25 MG per tablet Take 1 tablet by mouth daily 2/1/2025 Morning

## 2025-02-01 NOTE — PROGRESS NOTES
02/01/25 1455   Initial Information   Patient Belongings remains with patient   Patient Belongings Remaining with Patient cell phone/electronics;clothing;vision aids   Did you bring any home meds/supplements to the hospital?  No     A               Admission:  I am responsible for any personal items that are not sent to the safe or pharmacy.  [facility] is not responsible for loss, theft or damage of any property in my possession.    Signature:  _________________________________ Date: _______  Time: _____                                              Staff Signature:  ____________________________ Date: ________  Time: _____      2nd Staff person, if patient is unable/unwilling to sign:    Signature: ________________________________ Date: ________  Time: _____     Discharge:  [facility] has returned all of my personal belongings:    Signature: _________________________________ Date: ________  Time: _____                                          Staff Signature:  ____________________________ Date: ________  Time: _____

## 2025-02-01 NOTE — H&P
[facility]  History and Physical  Hospitalist       Date of Admission:  2/1/2025    Assessment & Plan   Lance Vergara is a 49 year old male who presents with heart palpitations.    Clinically Significant Risk Factors Present on Admission        # Hypokalemia: Lowest K = 3.2 mmol/L in last 2 days, will replace as needed  # Hyponatremia: Lowest Na = 132 mmol/L in last 2 days, will monitor as appropriate  # Hypochloremia: Lowest Cl = 90 mmol/L in last 2 days, will monitor as appropriate     # Anion Gap Metabolic Acidosis: Highest Anion Gap = 22 mmol/L in last 2 days, will monitor and treat as appropriate   # Drug Induced Coagulation Defect: home medication list includes an anticoagulant medication  # Drug Induced Platelet Defect: home medication list includes an antiplatelet medication   # Hypertension: Noted on problem list           # Obesity: Estimated body mass index is 33.23 kg/m  as calculated from the following:    Height as of this encounter: 1.829 m (6').    Weight as of this encounter: 111.1 kg (245 lb).                 > Atrial fibrillation with rapid ventricular response  Initially tachycardic, requiring diltiazem gtt in ER. Then became hypotensive with rate control and gtt stopped.  CHADS2-Vasc score 1.   -- Admit to ICU given initial RVR and subsequent hypotension   -- Hold diltiazem gtt for now   -- Oral diltiazem   -- Continue metoprolol   -- Continue aspirin   -- Recommend outpatient sleep study    >  Acute kidney injury  >  Hypokalemia, acute  > Hyponatremia, acute  >  Decreased oral intake  Suspect prerenal azotemia given decreased oral intake, vomiting, alcohol use.   -- IVF   -- Serial lab   -- Electrolyte replacement protocols    >  Acute alcoholic hepatitis (H)  >  Chronic alcoholic gastritis without hemorrhage  >  Alcoholic fatty liver  Likely significant contributing factor to above conditions.    -- Check hepatitis labs, INR, etc.   -- Consider repeat RUQ us vs CT   --  Recommend alcohol cessation   -- Recommend AA   -- CIWA protocol    >  Abdominal bloating  >  Other constipation  Endoscopy showed gastritis. Suspect symptoms secondary to alcohol use. DDx also includes nutcracker syndrome, superior mesenteric artery syndrome, occult malignancy, chronic pancreatitis, others   -- Bowel clean out   -- Trial sucralfate   -- Continue PPI   -- Follow-up as outpatient      DVT Prophylaxis: Pneumatic Compression Devices  Code Status: FULL    Radu Ruiz MD    Primary Care Physician   Physician No Ref-Primary    Chief Complaint   Heart palpitations    History is obtained from the patient and chart review.    History of Present Illness   Lance Vergara is a 49 year old male who presents with heart palpitations. He drinks 10 beers per day, but quit on Wednesday (3 days ago).  Has had some abdominal bloating with intermittent vomiting for months.  Appetite has been low. Has a history of afib with RVR, requiring cardioversion about 10 years ago. Follows with a cardiologist in [place]. Endorses a desire to quit drinking. No chest pain. No SPEAR. No hematemesis. Has been constipated.    Past Medical History    I have reviewed this patient's medical history and updated it with pertinent information if needed.   Past Medical History:   Diagnosis Date    Atrial fibrillation (H)     No Comments Provided    Essential hypertension     No Comments Provided    Obesity     No Comments Provided       Past Surgical History   I have reviewed this patient's surgical history and updated it with pertinent information if needed.  Past Surgical History:   Procedure Laterality Date    ESOPHAGOSCOPY, GASTROSCOPY, DUODENOSCOPY (EGD), COMBINED N/A 07/18/2024    stomach shows reactive gastropathy. Referral for esophageal manometry is placed for possible early achalasia vs nutcracker esophagus.       Prior to Admission Medications   Prior to Admission Medications   Prescriptions Last Dose Informant Patient  Reported? Taking?   apixaban ANTICOAGULANT (ELIQUIS) 5 MG tablet 2/1/2025 Morning  Yes Yes   Sig: Take 5 mg by mouth 2 times daily.   aspirin 81 MG EC tablet 2/1/2025 Morning  Yes Yes   Sig: Take 81 mg by mouth daily.   atorvastatin (LIPITOR) 40 MG tablet 2/1/2025 Morning  Yes Yes   Sig: Take 40 mg by mouth daily.   esomeprazole (NEXIUM) 20 MG DR capsule 2/1/2025 Morning  Yes Yes   Sig: Take 20 mg by mouth daily as needed (heartburn). Take 30-60 minutes before eating.   lisinopril (ZESTRIL) 40 MG tablet 2/1/2025 Morning  Yes Yes   Sig: Take 1 tablet by mouth daily.   metoprolol tartrate (LOPRESSOR) 100 MG tablet 2/1/2025 Morning  Yes Yes   Sig: Take 100 mg by mouth 2 times daily.   sildenafil (VIAGRA) 50 MG tablet More than a month  Yes Yes   Sig: Take  mg by mouth daily as needed (prior to sexual activity).   triamterene-hydrochlorothiazide (MAXZIDE-25) 37.5-25 MG per tablet 2/1/2025 Morning  Yes Yes   Sig: Take 1 tablet by mouth daily      Facility-Administered Medications: None     Allergies   No Known Allergies    Social History   I have reviewed this patient's social history and updated it with pertinent information if needed. Lance Vergara  reports that he has never smoked. His smokeless tobacco use includes chew. He reports that he does not currently use alcohol. He reports that he does not currently use drugs.    Family History   I have reviewed this patient's family history and updated it with pertinent information if needed.   History reviewed. No pertinent family history.    Review of Systems     Constitutional: normal energy and appetite, no recent sick contacts  Eyes: no changes in vision  Ears, nose, mouth, throat, and face: no mouth sores, dysphagia, or odynophagia  Respiratory: no shortness of breath, cough, or wheezing. No aspiration symptoms.   Cardiovascular: see hpi  Gastrointestinal: no constipation, diarrhea, nausea, vomiting or abdominal pain.  Genitourinary: no dysuria, hematuria,  urgency or frequency.   Hematologic/lymphatic: no unintentional weight loss or night sweats.  Musculoskeletal: no pain to extremities or falls.   Neurological: no new weakness, tingling, numbness.   Psychiatric: no hallucinations ordelusions.  Endocrine: not a known diabetic.     Additions to the above include: see hpi    Physical Exam   Temp: 97  F (36.1  C) Temp src: Tympanic BP: 93/64 Pulse: 88   Resp: 12 SpO2: 99 % O2 Device: None (Room air)    Vital Signs with Ranges  Temp:  [97  F (36.1  C)] 97  F (36.1  C)  Pulse:  [] 88  Resp:  [9-20] 12  BP: ()/(58-71) 93/64  SpO2:  [96 %-100 %] 99 %  245 lbs 0 oz    Gen: Alert, NAD.  HEENT: MMM  Neck: Supple  CV: irregularly irregular, distant  Pulm: CTAB, no w/r/r. No increased work of breathing  Msk: No LE edema  Abd: Soft. BS present, hyperactive  Neuro: Grossly intact  Skin: No concerning lesions.  Psychiatric: Normal affect and insight. Does not appear anxious or depressed.      Data   Data reviewed today:  I personally reviewed see below.  Recent Labs   Lab 02/01/25  1114   WBC 8.1   HGB 14.4   MCV 95      *   POTASSIUM 3.2*   CHLORIDE 90*   CO2 20*   BUN 43.5*   CR 3.06*   ANIONGAP 22*   JUANITA 9.9   *   ALBUMIN 4.3   PROTTOTAL 8.5*   BILITOTAL 2.0*   ALKPHOS 112   *   *       No results found for this or any previous visit (from the past 24 hours).    Disposition Plan   Medically Ready for Discharge: Anticipated in 2-4 Days

## 2025-02-01 NOTE — ED TRIAGE NOTES
ED Nursing Triage Note (General)   ________________________________    Lance Vergara is a 49 year old Male that presents to triage private car  with history of  SOB and irregular heart rate reported by patient . Symptoms started Wednesday. Interventions prior to arrival include bp meds. Patient stated that it started on wednesday and he decided to come in now because he is dizzy and lightheaded today.  BP 99/69   Pulse 113   Temp 97  F (36.1  C) (Tympanic)   Resp 20   Ht 1.829 m (6')   Wt 111.1 kg (245 lb)   SpO2 99%   BMI 33.23 kg/m  t  Patient appears alert  and oriented    GCS Total = 15    Action taken:  roomed        PRE HOSPITAL PRIOR LIVING SITUATION Spouse       Triage Assessment (Adult)       Row Name 02/01/25 1105          Triage Assessment    Airway WDL WDL        Respiratory WDL    Respiratory WDL X;rhythm/pattern     Rhythm/Pattern, Respiratory shortness of breath        Skin Circulation/Temperature WDL    Skin Circulation/Temperature WDL X;temperature     Skin Temperature cool        Cardiac WDL    Cardiac WDL X;rhythm     Pulse Rate & Regularity apical pulse irregular        Peripheral/Neurovascular WDL    Peripheral Neurovascular WDL WDL        Cognitive/Neuro/Behavioral WDL    Cognitive/Neuro/Behavioral WDL WDL

## 2025-02-01 NOTE — PROGRESS NOTES
[facility] ADMISSION NOTE    Patient admitted to room 915 at approximately 1440 via cart from emergency room. Patient was accompanied by nurse.     Verbal SBAR report received from DAYAN Holm prior to patient arrival.     Patient ambulated to bed with stand-by assist. Patient alert and oriented X 3. The patient is not having any pain.  . Admission vital signs: Blood pressure 98/64, pulse 82, temperature 97.7  F (36.5  C), temperature source Temporal, resp. rate 16, height 1.829 m (6'), weight 111.1 kg (245 lb), SpO2 99%. Patient was oriented to plan of care, call light, bed controls, tv, telephone, bathroom, and visiting hours.     Risk Assessment    The following safety risks were identified during admission: none. Yellow risk band applied: YES.     Skin Initial Assessment    This writer admitted this patient and completed a full skin assessment and Sumit score in the Adult PCS flowsheet.   Photo documentation of skin problem and/or wound competed via [employer] application (located under Media):  N/A    Appropriate interventions initiated as needed.     Secondary skin check completed by DAYAN Holm.         Education    Patient has a [place] to Observation order: No  Observation education completed and documented: N/A    Admission/Transfer from: ER  2 RN skin assessment completed. Yes  Significant findings include: None  WOC Nurse Consult Ordered? No       Kaylee Segovia RN

## 2025-02-01 NOTE — ED PROVIDER NOTES
History     Chief Complaint   Patient presents with    Shortness of Breath    Irregular Heart Beat       Lance Vergara is a 49 year old male who presents for palpitations. Onset 3 days ago.  Then today he noticed some lightheadedness and mild shortness of breath.  He checked his heart rate at home which was normal.  History includes paroxysmal atrial fibrillation with an electrical cardioversion at age 35.  He did start taking his Eliquis 2 days ago with concern that he was in atrial fibrillation and risk for stroke.  Consumes alcohol regularly 10 drinks daily but stopped several days ago, and is concerned that maybe he is having some withdrawals.  Denies any chest pain, lower extremity swelling, fever, cough.  He is on metoprolol and compliant.  He has diltiazem on his list but does not take this.  He has been having issues with keeping food down for the past year worsening recently.  Able to burp and then also he will burp and vomit, and then he can eat after this.  His wife states he really only consumes about 2 meals weekly due to the GI issues.  He has had EGD most recently that was normal.    No Known Allergies    Patient Active Problem List    Diagnosis Date Noted    Hypokalemia 02/01/2025     Priority: Medium    Atrial fibrillation with rapid ventricular response (H) 02/01/2025     Priority: Medium    Acute kidney injury 02/01/2025     Priority: Medium    Decreased oral intake 02/01/2025     Priority: Medium    Acute adjustment disorder with anxiety 05/02/2022     Priority: Medium    Hyperlipidemia 01/28/2016     Priority: Medium    Paroxysmal A-fib (H) 09/10/2015     Priority: Medium    HTN (hypertension) 10/16/2013     Priority: Medium    Obesity 10/16/2013     Priority: Medium       Past Medical History:   Diagnosis Date    Atrial fibrillation (H)     Essential hypertension     Obesity        Past Surgical History:   Procedure Laterality Date    ESOPHAGOSCOPY, GASTROSCOPY, DUODENOSCOPY (EGD), COMBINED  N/A 07/18/2024    stomach shows reactive gastropathy. Referral for esophageal manometry is placed for possible early achalasia vs nutcracker esophagus.       History reviewed. No pertinent family history.    Social History     Tobacco Use    Smoking status: Never    Smokeless tobacco: Current     Types: Chew   Vaping Use    Vaping status: Never Used   Substance Use Topics    Alcohol use: Not Currently     Comment: 2 weeks ago last drink    Drug use: Not Currently     Comment: Drug use: No       Medications:    apixaban ANTICOAGULANT (ELIQUIS) 5 MG tablet  aspirin 81 MG EC tablet  atorvastatin (LIPITOR) 40 MG tablet  lisinopril (ZESTRIL) 40 MG tablet  metoprolol tartrate (LOPRESSOR) 100 MG tablet  sildenafil (VIAGRA) 50 MG tablet  triamterene-hydrochlorothiazide (MAXZIDE-25) 37.5-25 MG per tablet        Review of Systems: See HPI for pertinent negatives and positives. All other systems reviewed and found to be negative.    Physical Exam   BP 93/64   Pulse 88   Temp 97  F (36.1  C) (Tympanic)   Resp 12   Ht 1.829 m (6')   Wt 111.1 kg (245 lb)   SpO2 99%   BMI 33.23 kg/m       General: awake, comfortable  HEENT: atraumatic  Respiratory: normal effort, clear to auscultation bilaterally  Cardiovascular: tachycardic, radial pulses pulses 2+  Abdomen: soft, nontender, nondistended  Extremities: no deformities, tenderness, edema  Skin: warm, dry, no rashes  Neuro: alert, no focal deficits    ED Course      ED Course as of 02/01/25 1320   Sat Feb 01, 2025   1146 EKG: AF with , mild V3-4 STD, no LBBB or I/II/V3-6 TWI.      1231 EKG demonstrating now rate controlled atrial fibrillation rate 85.  ST deviation in V3-4 is resolved.       Results for orders placed or performed during the hospital encounter of 02/01/25 (from the past 24 hours)   EKG 12-lead, tracing only   Result Value Ref Range    Systolic Blood Pressure  mmHg    Diastolic Blood Pressure  mmHg    Ventricular Rate 138 BPM    Atrial Rate 138 BPM    KY  Interval  ms    QRS Duration 82 ms     ms    QTc 454 ms    P Axis  degrees    R AXIS -4 degrees    T Axis 190 degrees    Interpretation ECG       Atrial fibrillation with rapid ventricular response  ST & T wave abnormality, consider anterolateral ischemia  Abnormal ECG  When compared with ECG of 29-Apr-2022 16:15,  Atrial fibrillation has replaced Sinus rhythm  Vent. rate has increased by  64 bpm  ST now depressed in Anterolateral leads  T wave inversion now evident in Anterolateral leads  Confirmed by MD PARADISE, TYREE ([id number]) on 2/1/2025 1:06:29 PM     [place] Draw *Canceled*    Narrative    The following orders were created for panel order [place] Draw.  Procedure                               Abnormality         Status                     ---------                               -----------         ------                       Please view results for these tests on the individual orders.   CBC with platelets differential    Narrative    The following orders were created for panel order CBC with platelets differential.  Procedure                               Abnormality         Status                     ---------                               -----------         ------                     CBC with platelets and d...[[id number]]  Abnormal            Final result                 Please view results for these tests on the individual orders.   Magnesium   Result Value Ref Range    Magnesium 2.2 1.7 - 2.3 mg/dL   TSH Reflex GH   Result Value Ref Range    TSH 1.95 0.30 - 4.20 uIU/mL   Comprehensive metabolic panel   Result Value Ref Range    Sodium 132 (L) 135 - 145 mmol/L    Potassium 3.2 (L) 3.4 - 5.3 mmol/L    Carbon Dioxide (CO2) 20 (L) 22 - 29 mmol/L    Anion Gap 22 (H) 7 - 15 mmol/L    Urea Nitrogen 43.5 (H) 6.0 - 20.0 mg/dL    Creatinine 3.06 (H) 0.67 - 1.17 mg/dL    GFR Estimate 24 (L) >60 mL/min/1.73m2    Calcium 9.9 8.8 - 10.4 mg/dL    Chloride 90 (L) 98 - 107 mmol/L    Glucose 217 (H) 70 - 99 mg/dL     Alkaline Phosphatase 112 40 - 150 U/L     (H) 0 - 45 U/L     (H) 0 - 70 U/L    Protein Total 8.5 (H) 6.4 - 8.3 g/dL    Albumin 4.3 3.5 - 5.2 g/dL    Bilirubin Total 2.0 (H) <=1.2 mg/dL   CBC with platelets and differential   Result Value Ref Range    WBC Count 8.1 4.0 - 11.0 10e3/uL    RBC Count 4.15 (L) 4.40 - 5.90 10e6/uL    Hemoglobin 14.4 13.3 - 17.7 g/dL    Hematocrit 39.5 (L) 40.0 - 53.0 %    MCV 95 78 - 100 fL    MCH 34.7 (H) 26.5 - 33.0 pg    MCHC 36.5 31.5 - 36.5 g/dL    RDW 14.7 10.0 - 15.0 %    Platelet Count 232 150 - 450 10e3/uL    % Neutrophils 52 %    % Lymphocytes 37 %    % Monocytes 8 %    % Eosinophils 2 %    % Basophils 1 %    % Immature Granulocytes 0 %    NRBCs per 100 WBC 0 <1 /100    Absolute Neutrophils 4.2 1.6 - 8.3 10e3/uL    Absolute Lymphocytes 3.0 0.8 - 5.3 10e3/uL    Absolute Monocytes 0.7 0.0 - 1.3 10e3/uL    Absolute Eosinophils 0.1 0.0 - 0.7 10e3/uL    Absolute Basophils 0.1 0.0 - 0.2 10e3/uL    Absolute Immature Granulocytes 0.0 <=0.4 10e3/uL    Absolute NRBCs 0.0 10e3/uL   Extra Tube    Narrative    The following orders were created for panel order Extra Tube.  Procedure                               Abnormality         Status                     ---------                               -----------         ------                     Extra Blue Top Tube[[id number]]                              Final result               Extra Red Top Tube[[id number]]                               Final result               Extra Purple Top Tube[[id number]]                                                       Extra Green Top (Lithium...[[id number]]                      Final result                 Please view results for these tests on the individual orders.   Extra Blue Top Tube   Result Value Ref Range    Hold Specimen JIC    Extra Red Top Tube   Result Value Ref Range    Hold Specimen JIC    Extra Green Top (Lithium Heparin) ON ICE   Result Value Ref Range    Hold Specimen JIC     Troponin T, High Sensitivity   Result Value Ref Range    Troponin T, High Sensitivity 12 <=22 ng/L   EKG 12-lead, tracing only   Result Value Ref Range    Systolic Blood Pressure  mmHg    Diastolic Blood Pressure  mmHg    Ventricular Rate 85 BPM    Atrial Rate 357 BPM    NC Interval  ms    QRS Duration 98 ms     ms    QTc 466 ms    P Axis  degrees    R AXIS -8 degrees    T Axis 11 degrees    Interpretation ECG       Atrial fibrillation  T wave abnormality, consider anterior ischemia  Prolonged QT  Abnormal ECG  When compared with ECG of 01-Feb-2025 11:08, (unconfirmed)  Vent. rate has decreased by  53 bpm  ST no longer depressed in Anterolateral leads  Nonspecific T wave abnormality has replaced inverted T waves in Lateral leads  Confirmed by MD PARADISE, [facility] ([id number]) on 2/1/2025 1:06:42 PM         Medications   diltiazem (CARDIZEM) 125 mg in dextrose 5 % 125 mL infusion (2.5 mg/hr Intravenous Rate/Dose Change 2/1/25 1232)   sodium chloride 0.9% BOLUS 1,000 mL (1,000 mLs Intravenous $New Bag 2/1/25 1150)   potassium chloride emy ER (KLOR-CON M20) CR tablet 40 mEq (40 mEq Oral $Given 2/1/25 1157)   LORazepam (ATIVAN) injection 1 mg (1 mg Intravenous $Given 2/1/25 1158)   sodium chloride 0.9% BOLUS 1,000 mL (1,000 mLs Intravenous $New Bag 2/1/25 1209)   calcium gluconate 10 % injection 2 g (0 g Intravenous Stopped 2/1/25 1224)       Assessments & Plan (with Medical Decision Making)     I have reviewed the nursing notes.    49 year old male with known atrial fibrillation evaluated for lightheadedness and mild shortness of breath on BB. Symptom onset 3 days ago and he did start taking his Eliquis 2 days ago.  EKG with AF with RVR rate 138 with slight ST depression in leads V3-4. Hemodynamically with softer pressures but maps above 65.  Diltiazem drip started.  Given lorazepam with significant anxiousness after not drinking alcohol for several days and feeling like he is going into mild withdrawals.  Labs  remarkable for mild hypokalemia and significant ISI.  He has had very poor p.o. intake over the past weeks to months with recurrent emesis after not being able to belch for period of time.  He has had a reassuring EGD.  Unclear cause for this.  Giving fluids for resuscitation.  Eventually rate controlled on diltiazem drip.  He did have some soft pressures but maps above 65.  Due to some evidence that calcium supplementation may help with pressures in this scenario, 2 g were given.  Repeat EKG with ST depression resolved.  Troponin reassuring 12.  His nerviousness improved with Lorazepam, and suspect there is element of alcohol withdrawal driving this.  Hypokalemia supplemented.  Case discussed with Dr. Ruiz who accepts ICU admission.    I have reviewed results, diagnosis, and plan with patient and wife who are in agreement with admission.    Critical care time:  35 minutes of critical care time was spent with this patient, exclusive of all other separately billable services, including EKG/labs/imaging review, managing acute condition, and communications with specialists/hospitalists.      New Prescriptions    No medications on file       Final diagnoses:   Atrial fibrillation with rapid ventricular response (H)   Acute kidney injury   Decreased oral intake - Over past year   Hypokalemia   Alcohol use disorder       2/1/2025   [facility] AND [facility]       Giuseppe Esqueda MD  02/01/25 1321

## 2025-02-02 LAB
ALBUMIN SERPL BCG-MCNC: 3.3 G/DL (ref 3.5–5.2)
ALP SERPL-CCNC: 77 U/L (ref 40–150)
ALT SERPL W P-5'-P-CCNC: 92 U/L (ref 0–70)
ANION GAP SERPL CALCULATED.3IONS-SCNC: 8 MMOL/L (ref 7–15)
AST SERPL W P-5'-P-CCNC: 152 U/L (ref 0–45)
BILIRUB DIRECT SERPL-MCNC: 0.54 MG/DL (ref 0–0.3)
BILIRUB SERPL-MCNC: 1.4 MG/DL
BUN SERPL-MCNC: 27.9 MG/DL (ref 6–20)
CALCIUM SERPL-MCNC: 8.4 MG/DL (ref 8.8–10.4)
CHLORIDE SERPL-SCNC: 104 MMOL/L (ref 98–107)
CREAT SERPL-MCNC: 2.02 MG/DL (ref 0.67–1.17)
EGFRCR SERPLBLD CKD-EPI 2021: 39 ML/MIN/1.73M2
ERYTHROCYTE [DISTWIDTH] IN BLOOD BY AUTOMATED COUNT: 15.3 % (ref 10–15)
GLUCOSE SERPL-MCNC: 118 MG/DL (ref 70–99)
HAV IGM SERPL QL IA: NONREACTIVE
HBV CORE AB SERPL QL IA: NONREACTIVE
HBV SURFACE AB SERPL IA-ACNC: <3.5 M[IU]/ML
HBV SURFACE AB SERPL IA-ACNC: NONREACTIVE M[IU]/ML
HBV SURFACE AG SERPL QL IA: NONREACTIVE
HCO3 SERPL-SCNC: 23 MMOL/L (ref 22–29)
HCT VFR BLD AUTO: 30.8 % (ref 40–53)
HCV AB SERPL QL IA: NONREACTIVE
HGB BLD-MCNC: 10.5 G/DL (ref 13.3–17.7)
HIV 1+2 AB+HIV1 P24 AG SERPL QL IA: NONREACTIVE
MCH RBC QN AUTO: 34.1 PG (ref 26.5–33)
MCHC RBC AUTO-ENTMCNC: 34.1 G/DL (ref 31.5–36.5)
MCV RBC AUTO: 100 FL (ref 78–100)
PHOSPHATE SERPL-MCNC: 2.3 MG/DL (ref 2.5–4.5)
PLATELET # BLD AUTO: 152 10E3/UL (ref 150–450)
POTASSIUM SERPL-SCNC: 3.7 MMOL/L (ref 3.4–5.3)
PROT SERPL-MCNC: 6.1 G/DL (ref 6.4–8.3)
RBC # BLD AUTO: 3.08 10E6/UL (ref 4.4–5.9)
SODIUM SERPL-SCNC: 135 MMOL/L (ref 135–145)
WBC # BLD AUTO: 6.7 10E3/UL (ref 4–11)

## 2025-02-02 PROCEDURE — 3E043XZ INTRODUCTION OF VASOPRESSOR INTO CENTRAL VEIN, PERCUTANEOUS APPROACH: ICD-10-PCS | Performed by: INTERNAL MEDICINE

## 2025-02-02 PROCEDURE — 120N000001 HC R&B MED SURG/OB

## 2025-02-02 PROCEDURE — 36415 COLL VENOUS BLD VENIPUNCTURE: CPT | Performed by: INTERNAL MEDICINE

## 2025-02-02 PROCEDURE — 99233 SBSQ HOSP IP/OBS HIGH 50: CPT | Performed by: INTERNAL MEDICINE

## 2025-02-02 PROCEDURE — 85027 COMPLETE CBC AUTOMATED: CPT | Performed by: INTERNAL MEDICINE

## 2025-02-02 PROCEDURE — 250N000013 HC RX MED GY IP 250 OP 250 PS 637: Performed by: INTERNAL MEDICINE

## 2025-02-02 PROCEDURE — 84100 ASSAY OF PHOSPHORUS: CPT | Performed by: INTERNAL MEDICINE

## 2025-02-02 PROCEDURE — 250N000009 HC RX 250: Performed by: SURGERY

## 2025-02-02 PROCEDURE — 82565 ASSAY OF CREATININE: CPT | Performed by: INTERNAL MEDICINE

## 2025-02-02 PROCEDURE — 84155 ASSAY OF PROTEIN SERUM: CPT | Performed by: INTERNAL MEDICINE

## 2025-02-02 RX ORDER — METOPROLOL TARTRATE 1 MG/ML
5 INJECTION, SOLUTION INTRAVENOUS
Status: DISCONTINUED | OUTPATIENT
Start: 2025-02-02 | End: 2025-02-03 | Stop reason: HOSPADM

## 2025-02-02 RX ORDER — METOPROLOL TARTRATE 100 MG/1
100 TABLET ORAL 2 TIMES DAILY
Status: DISCONTINUED | OUTPATIENT
Start: 2025-02-02 | End: 2025-02-03 | Stop reason: HOSPADM

## 2025-02-02 RX ADMIN — LORAZEPAM 1 MG: 1 TABLET ORAL at 01:00

## 2025-02-02 RX ADMIN — DILTIAZEM HYDROCHLORIDE 30 MG: 30 TABLET, FILM COATED ORAL at 05:25

## 2025-02-02 RX ADMIN — SUCRALFATE 1 G: 1 TABLET ORAL at 12:03

## 2025-02-02 RX ADMIN — LORAZEPAM 2 MG: 1 TABLET ORAL at 18:54

## 2025-02-02 RX ADMIN — ATORVASTATIN CALCIUM 40 MG: 40 TABLET, FILM COATED ORAL at 10:00

## 2025-02-02 RX ADMIN — SUCRALFATE 1 G: 1 TABLET ORAL at 17:16

## 2025-02-02 RX ADMIN — SUCRALFATE 1 G: 1 TABLET ORAL at 21:16

## 2025-02-02 RX ADMIN — ASPIRIN 81 MG: 81 TABLET, COATED ORAL at 10:00

## 2025-02-02 RX ADMIN — SODIUM CHLORIDE: 0.9 INJECTION, SOLUTION INTRAVENOUS at 21:20

## 2025-02-02 RX ADMIN — Medication 1 TABLET: at 10:00

## 2025-02-02 RX ADMIN — Medication 1 PACKET: at 01:00

## 2025-02-02 RX ADMIN — NOREPINEPHRINE BITARTRATE 0.03 MCG/KG/MIN: 0.02 INJECTION, SOLUTION INTRAVENOUS at 00:49

## 2025-02-02 RX ADMIN — SODIUM CHLORIDE: 0.9 INJECTION, SOLUTION INTRAVENOUS at 03:11

## 2025-02-02 RX ADMIN — PANTOPRAZOLE SODIUM 40 MG: 40 TABLET, DELAYED RELEASE ORAL at 10:01

## 2025-02-02 RX ADMIN — SUCRALFATE 1 G: 1 TABLET ORAL at 07:09

## 2025-02-02 RX ADMIN — FOLIC ACID 1 MG: 1 TABLET ORAL at 10:01

## 2025-02-02 RX ADMIN — METOPROLOL TARTRATE 100 MG: 100 TABLET, FILM COATED ORAL at 10:01

## 2025-02-02 RX ADMIN — Medication 1 PACKET: at 05:25

## 2025-02-02 RX ADMIN — THIAMINE HCL TAB 100 MG 100 MG: 100 TAB at 10:00

## 2025-02-02 RX ADMIN — CLONIDINE HYDROCHLORIDE 0.1 MG: 0.1 TABLET ORAL at 21:16

## 2025-02-02 RX ADMIN — METOPROLOL TARTRATE 100 MG: 100 TABLET, FILM COATED ORAL at 21:16

## 2025-02-02 RX ADMIN — LORAZEPAM 2 MG: 1 TABLET ORAL at 05:25

## 2025-02-02 RX ADMIN — SODIUM CHLORIDE: 0.9 INJECTION, SOLUTION INTRAVENOUS at 12:14

## 2025-02-02 RX ADMIN — LORAZEPAM 1 MG: 1 TABLET ORAL at 02:09

## 2025-02-02 ASSESSMENT — ACTIVITIES OF DAILY LIVING (ADL)
ADLS_ACUITY_SCORE: 23
ADLS_ACUITY_SCORE: 24
ADLS_ACUITY_SCORE: 25
ADLS_ACUITY_SCORE: 24
ADLS_ACUITY_SCORE: 23
ADLS_ACUITY_SCORE: 23
ADLS_ACUITY_SCORE: 24
ADLS_ACUITY_SCORE: 23
ADLS_ACUITY_SCORE: 24
ADLS_ACUITY_SCORE: 28
ADLS_ACUITY_SCORE: 24
ADLS_ACUITY_SCORE: 24
ADLS_ACUITY_SCORE: 23
ADLS_ACUITY_SCORE: 24
ADLS_ACUITY_SCORE: 25
ADLS_ACUITY_SCORE: 23
ADLS_ACUITY_SCORE: 24
ADLS_ACUITY_SCORE: 24
ADLS_ACUITY_SCORE: 28
ADLS_ACUITY_SCORE: 24
ADLS_ACUITY_SCORE: 23
ADLS_ACUITY_SCORE: 24
ADLS_ACUITY_SCORE: 24

## 2025-02-02 NOTE — PROGRESS NOTES
Additional 500 ml bolus given w/ no improvement of BP. Levophed started at prescribed starting dose of 0.03mcg/kg/min.     Kathy Flores RN on 2/2/2025 at 12:49 AM

## 2025-02-02 NOTE — PHARMACY
Pharmacy - Transfer Medication Reconciliation     The patient's transfer medication orders have been compared to the medication administration record and to the Prior to Admissions Medications list - any noted discrepancies were resolved with the MD. Patient from ICU to med/surg    Thank you. Pharmacy will continue to monitor.     Alma Jewell JUNIOR ....................  2/2/2025   4:17 PM

## 2025-02-02 NOTE — PROGRESS NOTES
[facility]    Hospitalist Progress Note      Assessment & Plan   Lance Vergara is a 49 year old male who was admitted on 2/1/2025.     Clinically Significant Risk Factors        # Hypokalemia: Lowest K = 3.2 mmol/L in last 2 days, will replace as needed  # Hyponatremia: Lowest Na = 132 mmol/L in last 2 days, will monitor as appropriate  # Hypochloremia: Lowest Cl = 90 mmol/L in last 2 days, will monitor as appropriate  # Hypocalcemia: Lowest Ca = 8.4 mg/dL in last 2 days, will monitor and replace as appropriate    # Anion Gap Metabolic Acidosis: Highest Anion Gap = 22 mmol/L in last 2 days, will monitor and treat as appropriate  # Hypoalbuminemia: Lowest albumin = 3.3 g/dL at 2/2/2025  5:09 AM, will monitor as appropriate  # Coagulation Defect: INR = 1.25 (Ref range: 0.85 - 1.15) and/or PTT = N/A, will monitor for bleeding    # Hypertension: Noted on problem list            # Overweight: Estimated body mass index is 28.54 kg/m  as calculated from the following:    Height as of this encounter: 1.829 m (6').    Weight as of this encounter: 95.4 kg (210 lb 6.4 oz)., PRESENT ON ADMISSION               > Atrial fibrillation with rapid ventricular response  Initially tachycardic, requiring diltiazem gtt in ER. Then became hypotensive with rate control and gtt stopped.  CHADS2-Vasc score 1.  Has been rate controlled since admission. Discussed anticoagulation for possible cardioversion in 30 days, but decided against.    -- transfer to floor   -- stop diltiazem   -- Increase metoprolol   -- Continue aspirin   -- Recommend outpatient sleep study   -- Outpatient cardiology follow-up, consider BENJIE cardioversion vs EP ablation if afib persists.     >  Acute kidney injury  >  Hypokalemia, acute  > Hyponatremia, acute  >  Decreased oral intake  Suspect prerenal azotemia given decreased oral intake, vomiting, alcohol use. Significant improvement with IVF   -- IVF   -- Serial lab   -- Electrolyte replacement  protocols     >  Acute alcoholic hepatitis (H)  >  Chronic alcoholic gastritis without hemorrhage  >  Alcoholic fatty liver  Likely significant contributing factor to above conditions.  Significant meld score, but downtrending some.     MELD 3.0: 19 at 2/2/2025  5:09 AM  MELD-Na: 19 at 2/2/2025  5:09 AM  Calculated from:  Serum Creatinine: 2.02 mg/dL at 2/2/2025  5:09 AM  Serum Sodium: 135 mmol/L at 2/2/2025  5:09 AM  Total Bilirubin: 1.4 mg/dL at 2/2/2025  5:09 AM  Serum Albumin: 3.3 g/dL at 2/2/2025  5:09 AM  INR(ratio): 1.25 at 2/1/2025 11:14 AM  Age at listing (hypothetical): 49 years  Sex: Male at 2/2/2025  5:09 AM        -- Check hepatitis labs, INR, etc.   -- Consider repeat RUQ us vs CT   -- Recommend alcohol cessation   -- Recommend AA   -- CIWA protocol     >  Abdominal bloating  >  Other constipation  Endoscopy showed gastritis. Suspect symptoms secondary to alcohol use. DDx also includes nutcracker syndrome, superior mesenteric artery syndrome, occult malignancy, chronic pancreatitis, others   -- Bowel clean out   -- Trial sucralfate   -- Continue PPI   -- Follow-up as outpatient    Diet: Combination Diet Regular Diet Adult    DVT Prophylaxis: Pneumatic Compression Devices  Zaragoza Catheter: Not present  Code Status: Full Code           Disposition Plan   1-2 days, when improved kidney function and ongoing stability of HR/BP.     Entered: Radu Ruiz MD 02/02/2025, 1:08 PM       The patient's care was discussed with the Bedside Nurse and Patient.    Radu Ruiz MD  Hospitalist Service  [facility]  Contact information available via [facility] Paging/Directory    ______________________________________________________________________    Interval History   CC: I'm feeling better  Some hypotension overnight, attributed to meds. Started briefly on pressors.     -Data reviewed today: I reviewed all new labs and imaging results over the last 24 hours. I personally reviewed no  images or EKG's today.    Physical Exam   Temp: 97.3  F (36.3  C) Temp src: Temporal BP: 104/60 Pulse: 77   Resp: 15 SpO2: 96 % O2 Device: None (Room air)    Vitals:    02/01/25 1104 02/02/25 0500   Weight: 111.1 kg (245 lb) 95.4 kg (210 lb 6.4 oz)     Vital Signs with Ranges  Temp:  [97.3  F (36.3  C)-98.1  F (36.7  C)] 97.3  F (36.3  C)  Pulse:  [] 77  Resp:  [5-46] 15  BP: ()/(38-80) 104/60  SpO2:  [79 %-100 %] 96 %  I/O last 3 completed shifts:  In: 3211.93 [I.V.:1211.93; IV Piggyback:2000]  Out: 1525 [Urine:1525]    Gen: Alert, NAD.  HEENT: MMM  Neck: Supple  CV: irregularly irregular no m/r/g  Pulm: CTAB, no w/r/r. No increased work of breathing  Msk: No LE edema  Abd: Soft  Neuro: Grossly intact  Skin: No concerning lesions.  Psychiatric: Normal affect and insight. Does not appear anxious or depressed.      Medications   Current Facility-Administered Medications   Medication Dose Route Frequency Provider Last Rate Last Admin    sodium chloride 0.9 % infusion   Intravenous Continuous Radu Ruiz  mL/hr at 02/02/25 1214 New Bag at 02/02/25 1214     Current Facility-Administered Medications   Medication Dose Route Frequency Provider Last Rate Last Admin    aspirin EC tablet 81 mg  81 mg Oral Daily Radu Ruiz MD   81 mg at 02/02/25 1000    atorvastatin (LIPITOR) tablet 40 mg  40 mg Oral Daily Radu Ruiz MD   40 mg at 02/02/25 1000    cloNIDine (CATAPRES) tablet 0.1 mg  0.1 mg Oral TID Radu Ruiz MD        folic acid (FOLVITE) tablet 1 mg  1 mg Oral Daily Radu Ruiz MD   1 mg at 02/02/25 1001    metoprolol tartrate (LOPRESSOR) tablet 100 mg  100 mg Oral BID Radu Ruiz MD   100 mg at 02/02/25 1001    multivitamin w/minerals (THERA-VIT-M) tablet 1 tablet  1 tablet Oral Daily Radu Ruiz MD   1 tablet at 02/02/25 1000    pantoprazole (PROTONIX) EC tablet 40 mg  40 mg Oral Daily Radu Ruiz MD   40 mg at  02/02/25 1001    sodium chloride (PF) 0.9% PF flush 3 mL  3 mL Intracatheter Q8H Radu Ruiz MD   3 mL at 02/02/25 0525    sucralfate (CARAFATE) tablet 1 g  1 g Oral 4x Daily AC & HS Radu Ruiz MD   1 g at 02/02/25 1203    thiamine (B-1) tablet 100 mg  100 mg Oral Daily Radu Ruiz MD   100 mg at 02/02/25 1000       Data   Recent Labs   Lab 02/02/25  0509 02/01/25  2036 02/01/25  1114   WBC 6.7  --  8.1   HGB 10.5*  --  14.4     --  95     --  232   INR  --   --  1.25*     --  132*   POTASSIUM 3.7 3.8 3.2*   CHLORIDE 104  --  90*   CO2 23  --  20*   BUN 27.9*  --  43.5*   CR 2.02*  --  3.06*   ANIONGAP 8  --  22*   JUANITA 8.4*  --  9.9   *  --  217*   ALBUMIN 3.3*  --  4.3   PROTTOTAL 6.1*  --  8.5*   BILITOTAL 1.4*  --  2.0*   ALKPHOS 77  --  112   ALT 92*  --  134*   *  --  221*   LIPASE  --   --  109*       No results found for this or any previous visit (from the past 24 hours).

## 2025-02-02 NOTE — PLAN OF CARE
"Goal Outcome Evaluation:  Pt A&OX4. HR has been controlled with rate between 80s-90s. Remains in Afib. BP's have been soft. MAP remains >65. Pt had 1 L bolus NS since admit to ICU. NS now infusing at 100 mL/hr. O2 on room air. LS clear. Denies any pain or SOB or nausea. Up with SBA.       Plan of Care Reviewed With: patient    Overall Patient Progress: improving    Outcome Evaluation: HR controlled on PO medications      Problem: Adult Inpatient Plan of Care  Goal: Plan of Care Review  Description: The Plan of Care Review/Shift note should be completed every shift.  The Outcome Evaluation is a brief statement about your assessment that the patient is improving, declining, or no change.  This information will be displayed automatically on your shift  note.  Outcome: Progressing  Flowsheets (Taken 2/1/2025 1829)  Outcome Evaluation: HR controlled on PO medications  Plan of Care Reviewed With: patient  Overall Patient Progress: improving  Goal: Patient-Specific Goal (Individualized)  Description: You can add care plan individualizations to a care plan. Examples of Individualization might be:  \"Parent requests to be called daily at 9am for status\", \"I have a hard time hearing out of my right ear\", or \"Do not touch me to wake me up as it startles  me\".  Outcome: Progressing  Goal: Absence of Hospital-Acquired Illness or Injury  Outcome: Progressing  Intervention: Identify and Manage Fall Risk  Recent Flowsheet Documentation  Taken 2/1/2025 1500 by Kaylee Segovia RN  Safety Promotion/Fall Prevention: activity supervised  Intervention: Prevent Skin Injury  Recent Flowsheet Documentation  Taken 2/1/2025 1500 by Kaylee Segovia, RN  Body Position: position changed independently  Intervention: Prevent and Manage VTE (Venous Thromboembolism) Risk  Recent Flowsheet Documentation  Taken 2/1/2025 1500 by Kaylee Segovia, RN  VTE Prevention/Management: SCDs off (sequential compression devices)  Goal: Optimal Comfort and " Wellbeing  Outcome: Progressing  Intervention: Provide Person-Centered Care  Recent Flowsheet Documentation  Taken 2/1/2025 1500 by Kaylee Segovia, RN  Trust Relationship/Rapport:   care explained   choices provided  Goal: Readiness for Transition of Care  Outcome: Progressing  Intervention: Mutually Develop Transition Plan  Recent Flowsheet Documentation  Taken 2/1/2025 1455 by Kaylee Segovia, RN  Equipment Currently Used at Home: none

## 2025-02-02 NOTE — PLAN OF CARE
"Pt remains in a-fib w/ controlled rate ranging from 60's-90's. He did become hypotensive early in the shift and was given (2) 500cc bolus w/ no improvement of pressures. Levophed was eventually started to maintain a MAP >65. See MAR for current dosage. Metoprolol dosage was decreased per tele ICU from 100mg to 50 mg. 0000 diltiazem and 2100/0600 clonidine were held per Tele ICU. 0600 diltiazem was given after consulting w/ Tele ICU. Pt's CIWA ranged from 3-13, scoring mostly for diaphoresis and tremors. Accumulative of 4 mg given. Pt remained on room air throughout night. SBA to bathroom.     Problem: Adult Inpatient Plan of Care  Goal: Plan of Care Review  2/2/2025 0614 by Kathy Flores RN  Outcome: Progressing  Flowsheets (Taken 2/2/2025 0614)  Plan of Care Reviewed With: patient  Overall Patient Progress: no change  2/2/2025 0234 by Kathy Flores RN  Outcome: Progressing  Flowsheets (Taken 2/2/2025 0234)  Plan of Care Reviewed With: patient  Overall Patient Progress: no change  Goal: Patient-Specific Goal (Individualized)  Description: You can add care plan individualizations to a care plan. Examples of Individualization might be:  \"Parent requests to be called daily at 9am for status\", \"I have a hard time hearing out of my right ear\", or \"Do not touch me to wake me up as it startles  me\".  2/2/2025 0614 by Kathy Flores RN  Outcome: Progressing  2/2/2025 0234 by Kathy Flores RN  Outcome: Progressing  Goal: Absence of Hospital-Acquired Illness or Injury  2/2/2025 0614 by Kathy Flores RN  Outcome: Progressing  2/2/2025 0234 by Kathy Flores RN  Outcome: Progressing  Intervention: Identify and Manage Fall Risk  Recent Flowsheet Documentation  Taken 2/1/2025 2000 by Kathy Flores RN  Safety Promotion/Fall Prevention:   lighting adjusted   nonskid shoes/slippers when out of bed   room near nurse's station   treat underlying cause   clutter free environment " maintained  Intervention: Prevent Skin Injury  Recent Flowsheet Documentation  Taken 2/2/2025 0000 by Kathy Flores RN  Body Position: position changed independently  Taken 2/1/2025 2200 by Kathy Flores RN  Body Position: position changed independently  Taken 2/1/2025 2040 by Kathy Flores RN  Body Position: supine  Taken 2/1/2025 2020 by Kathy Flores RN  Body Position: trendelenburg  Taken 2/1/2025 2000 by Kathy Flores RN  Body Position: position changed independently  Taken 2/1/2025 1900 by Kathy Flores RN  Body Position: position changed independently  Intervention: Prevent and Manage VTE (Venous Thromboembolism) Risk  Recent Flowsheet Documentation  Taken 2/1/2025 2000 by Kathy Flores RN  VTE Prevention/Management: (on anticogulation) SCDs off (sequential compression devices)  Goal: Optimal Comfort and Wellbeing  2/2/2025 0614 by Kathy Flores RN  Outcome: Progressing  2/2/2025 0234 by Kathy Flores RN  Outcome: Progressing  Intervention: Provide Person-Centered Care  Recent Flowsheet Documentation  Taken 2/1/2025 2000 by Kathy Flores RN  Trust Relationship/Rapport:   care explained   choices provided   questions answered  Goal: Readiness for Transition of Care  2/2/2025 0614 by Kathy Flores RN  Outcome: Progressing  2/2/2025 0234 by Kathy Flores RN  Outcome: Progressing     Problem: Dysrhythmia  Goal: Normalized Cardiac Rhythm  2/2/2025 0614 by Kathy Flores RN  Outcome: Progressing  2/2/2025 0234 by Kathy Flores RN  Outcome: Progressing  Intervention: Monitor and Manage Cardiac Rhythm Effect  Recent Flowsheet Documentation  Taken 2/1/2025 2000 by Kathy Flores RN  VTE Prevention/Management: (on anticogulation) SCDs off (sequential compression devices)     Problem: Alcohol Withdrawal  Goal: Alcohol Withdrawal Symptom Control  2/2/2025 0614 by Sandra, Evelyne-Brenda B, RN  Outcome: Progressing  2/2/2025 0234 by Sandra,  Kathy HEAD RN  Outcome: Progressing  Goal: Optimal Neurologic Function  2/2/2025 0614 by Kathy Flores RN  Outcome: Progressing  2/2/2025 0234 by Kathy Flores RN  Outcome: Progressing  Goal: Readiness for Change Identified  2/2/2025 0614 by Kathy Flores RN  Outcome: Progressing  2/2/2025 0234 by Kathy Flores RN  Outcome: Progressing   Goal Outcome Evaluation:      Plan of Care Reviewed With: patient    Overall Patient Progress: no changeOverall Patient Progress: no change         Kathy Flores RN on 2/2/2025 at 6:14 AM

## 2025-02-02 NOTE — PROGRESS NOTES
Tele ICU contacted due to pt becoming hypotensive w/ recent BP reporting to be 69/38 MAP of 50. Pt was put into [place] position at this time. 500 mL bolus was ordered along w/ electrolyte labs. Pt is asymptomatic.     Kathy Flores RN on 2/1/2025 at 8:25 PM    BP's improving w/ NS bolus. Returned back to flat HOB/supine position. Trending blood pressures q5 minutes until stable.     Kathy Flores RN on 2/1/2025 at 8:45 PM

## 2025-02-02 NOTE — PLAN OF CARE
Goal Outcome Evaluation: Patient's HR controlled at this time. VSS. Afebrile. Denies pain. Denies shortness of breath. Ambulating without difficulty. Family at bedside. CIWA score of 3; no interventions required.    Plan of Care Reviewed With: patient    Overall Patient Progress: improving Overall Patient Progress: improving

## 2025-02-02 NOTE — PROGRESS NOTES
Patient transferred to room 333 form ICU at 1520. Report received from DAYAN Bai.     Temp: 98  F (36.7  C) Temp src: Tympanic BP: 107/63 Pulse: 87   Resp: 16 SpO2: 97 % O2 Device: None (Room air)

## 2025-02-03 VITALS
DIASTOLIC BLOOD PRESSURE: 74 MMHG | HEART RATE: 78 BPM | RESPIRATION RATE: 16 BRPM | BODY MASS INDEX: 32.66 KG/M2 | HEIGHT: 72 IN | SYSTOLIC BLOOD PRESSURE: 123 MMHG | WEIGHT: 241.1 LBS | OXYGEN SATURATION: 97 % | TEMPERATURE: 98.9 F

## 2025-02-03 LAB
ANION GAP SERPL CALCULATED.3IONS-SCNC: 11 MMOL/L (ref 7–15)
BUN SERPL-MCNC: 18.5 MG/DL (ref 6–20)
CALCIUM SERPL-MCNC: 8.7 MG/DL (ref 8.8–10.4)
CHLORIDE SERPL-SCNC: 105 MMOL/L (ref 98–107)
CMV IGG SERPL IA-ACNC: <0.2 U/ML
CMV IGG SERPL IA-ACNC: NORMAL
CMV IGM SERPL IA-ACNC: <8 AU/ML
CMV IGM SERPL IA-ACNC: NEGATIVE
CREAT SERPL-MCNC: 1.69 MG/DL (ref 0.67–1.17)
EBV VCA IGG SER IA-ACNC: 319 U/ML
EBV VCA IGG SER IA-ACNC: POSITIVE
EBV VCA IGM SER IA-ACNC: 91 U/ML
EBV VCA IGM SER IA-ACNC: ABNORMAL
EGFRCR SERPLBLD CKD-EPI 2021: 49 ML/MIN/1.73M2
ERYTHROCYTE [DISTWIDTH] IN BLOOD BY AUTOMATED COUNT: 15.3 % (ref 10–15)
GLUCOSE SERPL-MCNC: 100 MG/DL (ref 70–99)
HCO3 SERPL-SCNC: 25 MMOL/L (ref 22–29)
HCT VFR BLD AUTO: 30.4 % (ref 40–53)
HGB BLD-MCNC: 10.5 G/DL (ref 13.3–17.7)
HOLD SPECIMEN: NORMAL
MCH RBC QN AUTO: 34.5 PG (ref 26.5–33)
MCHC RBC AUTO-ENTMCNC: 34.5 G/DL (ref 31.5–36.5)
MCV RBC AUTO: 100 FL (ref 78–100)
PLATELET # BLD AUTO: 116 10E3/UL (ref 150–450)
POTASSIUM SERPL-SCNC: 3.7 MMOL/L (ref 3.4–5.3)
RBC # BLD AUTO: 3.04 10E6/UL (ref 4.4–5.9)
SODIUM SERPL-SCNC: 141 MMOL/L (ref 135–145)
WBC # BLD AUTO: 5.7 10E3/UL (ref 4–11)

## 2025-02-03 PROCEDURE — 99238 HOSP IP/OBS DSCHRG MGMT 30/<: CPT | Performed by: INTERNAL MEDICINE

## 2025-02-03 PROCEDURE — 82565 ASSAY OF CREATININE: CPT | Performed by: INTERNAL MEDICINE

## 2025-02-03 PROCEDURE — 250N000013 HC RX MED GY IP 250 OP 250 PS 637: Performed by: INTERNAL MEDICINE

## 2025-02-03 PROCEDURE — 85049 AUTOMATED PLATELET COUNT: CPT | Performed by: INTERNAL MEDICINE

## 2025-02-03 PROCEDURE — 36415 COLL VENOUS BLD VENIPUNCTURE: CPT | Performed by: INTERNAL MEDICINE

## 2025-02-03 RX ORDER — METOPROLOL TARTRATE 100 MG/1
100 TABLET ORAL 2 TIMES DAILY
Qty: 60 TABLET | Refills: 0 | Status: SHIPPED | OUTPATIENT
Start: 2025-02-03

## 2025-02-03 RX ADMIN — THIAMINE HCL TAB 100 MG 100 MG: 100 TAB at 09:02

## 2025-02-03 RX ADMIN — ASPIRIN 81 MG: 81 TABLET, COATED ORAL at 09:02

## 2025-02-03 RX ADMIN — PANTOPRAZOLE SODIUM 40 MG: 40 TABLET, DELAYED RELEASE ORAL at 09:02

## 2025-02-03 RX ADMIN — SODIUM CHLORIDE: 0.9 INJECTION, SOLUTION INTRAVENOUS at 06:56

## 2025-02-03 RX ADMIN — ATORVASTATIN CALCIUM 40 MG: 40 TABLET, FILM COATED ORAL at 09:02

## 2025-02-03 RX ADMIN — CLONIDINE HYDROCHLORIDE 0.1 MG: 0.1 TABLET ORAL at 06:03

## 2025-02-03 RX ADMIN — SUCRALFATE 1 G: 1 TABLET ORAL at 07:47

## 2025-02-03 RX ADMIN — Medication 1 TABLET: at 09:02

## 2025-02-03 RX ADMIN — FOLIC ACID 1 MG: 1 TABLET ORAL at 09:02

## 2025-02-03 RX ADMIN — METOPROLOL TARTRATE 100 MG: 100 TABLET, FILM COATED ORAL at 09:02

## 2025-02-03 ASSESSMENT — ACTIVITIES OF DAILY LIVING (ADL)
ADLS_ACUITY_SCORE: 28
ADLS_ACUITY_SCORE: 28
ADLS_ACUITY_SCORE: 32
ADLS_ACUITY_SCORE: 28

## 2025-02-03 NOTE — PHARMACY - DISCHARGE MEDICATION RECONCILIATION AND EDUCATION
Pharmacy:  Discharge Counseling and Medication Reconciliation    Lance JERMAN Jai  [address] [place] [address]  [phone] (home) [phone] (work)  49 year old male  PCP: No Ref-Primary, Physician    Allergies: Patient has no known allergies.    Discharge Counseling:    Pharmacist met with patient (and/or family) today to review the medication portion of the After Visit Summary (with an emphasis on NEW medications) and to address patient's questions/concerns.    Summary of Education: Reviewed continuing on metoprolol 100 mg BID- patient confirms he has adequate supplies at home. Reviewed stopping eliquis, traimterene/hydrochlorothiazide, and lisinopril. Reviewed that patient has been on metoprolol 100 mg inpatient and Bps/HR have been good- encouraged continued monitoring at home.    Materials Provided:  MedCounselor sheets printed from Clinical Pharmacology on: N/A    Discharge Medication Reconciliation:    It has been determined that the patient has an adequate supply of medications available or which can be obtained from the patient's preferred pharmacy, which HE/SHE has confirmed as: N/A- none new to patient    Thank you for the consult.    Alma Jewell [facility]........February 3, 2025 9:12 AM

## 2025-02-03 NOTE — PROGRESS NOTES
Chart reviewed per MST screen: pt unsure about wt loss but has had GI issues for past ~ year. His weight is down slightly in past 7 months. He is planning to return home today. Will monitor intakes and diet tolerance if he remains hospitalized.   Diet: regular  Intakes: not recorded  Wt Readings from Last 10 Encounters:   02/03/25 109.4 kg (241 lb 1.6 oz)   07/18/24 115.2 kg (254 lb)   07/18/24 115.2 kg (254 lb)   05/02/22 129.3 kg (285 lb)   04/29/22 123.4 kg (272 lb)   01/07/22 120.2 kg (265 lb)   01/05/21 117.9 kg (260 lb)   10/16/13 124.3 kg (274 lb)      Follow up in 1-5 days if he is still hospitalized.   Ellie Grimm RD on 2/3/2025 at 8:32 AM

## 2025-02-03 NOTE — PLAN OF CARE
Goal Outcome Evaluation:      Plan of Care Reviewed With: patient    /73 (BP Location: Left arm)   Pulse 80   Temp 98.2  F (36.8  C) (Tympanic)   Resp 16   Ht 1.829 m (6')   Wt 109.4 kg (241 lb 1.6 oz)   SpO2 97%   BMI 32.70 kg/m      VSS, HR stayed in the upper 70's to low 100's throughout the night.  At beginning of shift he spiked into the 170's when he was up to the bathroom and he was very anxious.  Anxiety has resolved and pt states he feels better this morning after getting some sleep.  Elodia Puri RN............................ 2/3/2025 6:29 AM

## 2025-02-03 NOTE — PROGRESS NOTES
[facility] DISCHARGE NOTE    Patient discharged to home at 9:52 AM via wheel chair. Accompanied by spouse and staff. Discharge instructions reviewed with patient, opportunity offered to ask questions. Prescriptions sent to patients preferred pharmacy. All belongings sent with patient.    Page Sanchez RN

## 2025-02-03 NOTE — PLAN OF CARE
Goal Outcome Evaluation: Patient rested comfortably. HR controlled without PRN medication. Asymptomatic tachycardia with activity. Afebrile. VSS. CIWA score of 2. Ambulating without difficulty.     Plan of Care Reviewed With: patient    Overall Patient Progress: improving Overall Patient Progress: improving

## 2025-02-03 NOTE — DISCHARGE INSTRUCTIONS
-- Call and schedule an appointment with your Cardiologist   -- Low salt, high protein diet   -- Avoid alcohol   -- Consider attending an AA meeting

## 2025-02-03 NOTE — DISCHARGE SUMMARY
[facility]  Discharge Summary  Hospitalist    Date of Admission:  2/1/2025  Date of Discharge:  2/3/2025  Discharging Provider: Radu Ruiz MD  Date of Service (when I saw the patient): 02/03/25    Discharge Diagnoses   Principal Problem:    Atrial fibrillation with rapid ventricular response (H)    Date Noted: 2/1/2025  Active Problems:    Hypokalemia    Date Noted: 2/1/2025    Acute kidney injury    Date Noted: 2/1/2025    Decreased oral intake    Date Noted: 2/1/2025    Acute alcoholic hepatitis (H)    Date Noted: 2/1/2025    Abdominal bloating    Date Noted: 2/1/2025    Other constipation    Date Noted: 2/1/2025    Alcoholic fatty liver    Date Noted: 2/1/2025    Chronic alcoholic gastritis without hemorrhage    Date Noted: 2/1/2025    Alcohol use disorder    Date Noted: 2/1/2025      History of Present Illness   From my H&P:  Lance Vergara is a 49 year old male who presents with heart palpitations. He drinks 10 beers per day, but quit on Wednesday (3 days ago).  Has had some abdominal bloating with intermittent vomiting for months.  Appetite has been low. Has a history of afib with RVR, requiring cardioversion about 10 years ago. Follows with a cardiologist in [place]. Endorses a desire to quit drinking. No chest pain. No SPEAR. No hematemesis. Has been constipated.     Hospital Course   Initially treated with diltiazem for rate control.  Had some hypotension requiring brief pressors.  Transitioned over to beta-blocker along with rate control and normotensive.  Attributing recurrence of atrial fibrillation to heavy daily alcohol use.  Patient endorses a desire to quit drinking.  Other antihypertensive agents were held to facilitate beta-blocker.  Close follow-up recommended.  Recommend follow-up with cardiologist to consider EP cardiology evaluation for ablation versus BENJIE with cardioversion.  Given his [id number] score of 1 aspirin recommended.  He had an acute kidney injury  preceding his hospital stay which improved and trend throughout the stay and we will recheck in clinic follow-up.    Radu Ruiz MD    Significant Results and Procedures   na    Pending Results   These results will be followed up by Radu Ruiz MD   Unresulted Labs Ordered in the Past 30 Days of this Admission       Date and Time Order Name Status Description    2/1/2025  3:03 PM CMV antibody IgM In process     2/1/2025  3:03 PM CMV Antibody IgG In process     2/1/2025  3:03 PM EBV Capsid Antibody IgM In process     2/1/2025  3:03 PM EBV Capsid Antibody IgG In process     2/1/2025  3:03 PM Hepatitis E Antibody IgG In process             Code Status   Full Code       Primary Care Physician   Physician No Ref-Primary    Physical Exam   Temp: 98.9  F (37.2  C) Temp src: Tympanic BP: 123/74 Pulse: 78   Resp: 16 SpO2: 97 % O2 Device: None (Room air)    Vitals:    02/01/25 1104 02/02/25 0500 02/03/25 0534   Weight: 111.1 kg (245 lb) 95.4 kg (210 lb 6.4 oz) 109.4 kg (241 lb 1.6 oz)     Vital Signs with Ranges  Temp:  [97.3  F (36.3  C)-99  F (37.2  C)] 98.9  F (37.2  C)  Pulse:  [] 78  Resp:  [13-19] 16  BP: ()/(55-79) 123/74  SpO2:  [96 %-100 %] 97 %  I/O last 3 completed shifts:  In: 2170.33 [I.V.:2170.33]  Out: -     Gen: Alert, NAD.  Neck: No carotid bruits  CV: irregularly irregular no m/r/g  Pulm: CTAB, no w/r/r. No increased work of breathing  Msk: trace LE edema  Neuro: Grossly intact  Skin: No concerning lesions.  Psychiatric: Normal affect and insight. Does not appear anxious or depressed.      Discharge Disposition   Discharged to home  Condition at discharge: Stable    Consultations This Hospital Stay   None    Time Spent on this Encounter   I, Radu Ruiz MD, personally saw the patient today and spent less than or equal to 30 minutes discharging this patient.    Discharge Orders      Reason for your hospital stay    Afib with rvr     Follow-up and recommended labs and  tests     1. Dr. Ruiz within 2 weeks, check BMP and BP/HR  2. Your cardiologist, next available     Activity    Your activity upon discharge: activity as tolerated     Diet    Follow this diet upon discharge: Current Diet:Orders Placed This Encounter      Combination Diet Regular Diet Adult     Discharge Medications   Current Discharge Medication List        CONTINUE these medications which have CHANGED    Details   metoprolol tartrate (LOPRESSOR) 100 MG tablet Take 1 tablet (100 mg) by mouth 2 times daily.  Qty: 60 tablet, Refills: 0    Associated Diagnoses: Atrial fibrillation with rapid ventricular response (H); Paroxysmal A-fib (H)           CONTINUE these medications which have NOT CHANGED    Details   aspirin 81 MG EC tablet Take 81 mg by mouth daily.      atorvastatin (LIPITOR) 40 MG tablet Take 40 mg by mouth daily.      esomeprazole (NEXIUM) 20 MG DR capsule Take 20 mg by mouth daily as needed (heartburn). Take 30-60 minutes before eating.      sildenafil (VIAGRA) 50 MG tablet Take  mg by mouth daily as needed (prior to sexual activity).           STOP taking these medications       apixaban ANTICOAGULANT (ELIQUIS) 5 MG tablet Comments:   Reason for Stopping:         lisinopril (ZESTRIL) 40 MG tablet Comments:   Reason for Stopping:         triamterene-hydrochlorothiazide (MAXZIDE-25) 37.5-25 MG per tablet Comments:   Reason for Stopping:             Allergies   No Known Allergies  Data   Most Recent 3 CBC's:  Recent Labs   Lab Test 02/03/25  0549 02/02/25  0509 02/01/25  1114   WBC 5.7 6.7 8.1   HGB 10.5* 10.5* 14.4    100 95   * 152 232      Most Recent 3 BMP's:  Recent Labs   Lab Test 02/03/25  0549 02/02/25  0509 02/01/25  2036 02/01/25  1114    135  --  132*   POTASSIUM 3.7 3.7 3.8 3.2*   CHLORIDE 105 104  --  90*   CO2 25 23  --  20*   BUN 18.5 27.9*  --  43.5*   CR 1.69* 2.02*  --  3.06*   ANIONGAP 11 8  --  22*   JUANITA 8.7* 8.4*  --  9.9   * 118*  --  217*     Most  Recent 2 LFT's:  Recent Labs   Lab Test 25  0509 25  1114   * 221*   ALT 92* 134*   ALKPHOS 77 112   BILITOTAL 1.4* 2.0*     Most Recent INR's and Anticoagulation Dosing History:  Anticoagulation Dose History          Latest Ref Rng & Units 2025   Recent Dosing and Labs   INR 0.85 - 1.15 1.25      Most Recent 3 Troponin's:No lab results found.  Most Recent Cholesterol Panel:No lab results found.  Most Recent 6 Bacteria Isolates From Any Culture (See EPIC Reports for Culture Details):No lab results found.  Most Recent TSH, T4 and A1c Labs:  Recent Labs   Lab Test 25  1114   TSH 1.95     Results for orders placed or performed in visit on 09   [facility] INTERFACED RAD RESULT    Narrative    FINAL RESULT  MARIAN AGUIRRE    MRN: [id number]                    09    [facility]        [address] [place] [address]  Phone:  [phone]  Fax   #:  [phone]           Address:  [address] [place], [place] [id number]  Exam performed by: REED MASON             Patient Phone#: [phone]  Clinical:  HEEL PAIN, ACHILLES TENDON  ________________________________________________________________________  _______  1 RT [id number] OSCALCIS (HEEL)        ________________________________________________________________________  ____________  TWO VIEWS RIGHT CALCANEUS  09    There is a small posterior calcaneal bone spur present.  There was no  evidence for fracture, bony destructive lesion, or soft tissue  calcifications.           Read By:  HANNA ANDINO M.D.           Approved  By:  HANNA ANDINO M.D.  BAHWEJ  T:09 07:36  ________________________________________________________________________  ___  :1975 AGE: 34 SEX: M VT:   RM#:  Family Physician: MD REUBEN, HANNA  Ordering Physician: MD REUBEN, HANNA RUSSELL:[id number]    1

## 2025-02-03 NOTE — PROGRESS NOTES
Notified RN House Supervisor pt  HR sustaining in the 150's to 160's for about 4 minutes. Highest .

## 2025-02-04 ENCOUNTER — PATIENT OUTREACH (OUTPATIENT)
Dept: FAMILY MEDICINE | Facility: OTHER | Age: 50
End: 2025-02-04
Payer: COMMERCIAL

## 2025-02-04 NOTE — TELEPHONE ENCOUNTER
Called and spoke with patient.  Reviewed hospital discharge with patient.  Patient hospital follow up scheduled, now, on 2/11/25 with Dr. Ruiz in provider add spot (okay'd by Dr. Ruiz)  Patient states he is scheduled to see cardiology on Friday 2/7/25.    After reviewing medications:  Patient is wondering why Eliquis was stopped?  States it was prescribed by his cardiologist and patient to start taking if he went into A-Fib and continue to take until he was able to get into see cardiologist to prevent blood clot.  Informed patient will route to Dr. Ruiz for review and advise.  Siri Davalos RN on 2/4/2025 at 11:45 AM      Transitions of Care Outreach  Chief Complaint   Patient presents with    Hospital F/U       Most Recent Admission Date: 2/1/2025   Most Recent Admission Diagnosis: Hypokalemia - E87.6  Atrial fibrillation with rapid ventricular response (H) - I48.91  Acute kidney injury - N17.9  Decreased oral intake - R63.8  Alcohol use disorder - F10.90     Most Recent Discharge Date: 2/3/2025   Most Recent Discharge Diagnosis: Atrial fibrillation with rapid ventricular response (H) - I48.91  Acute kidney injury - N17.9  Decreased oral intake - R63.8  Hypokalemia - E87.6  Alcohol use disorder - F10.90  Paroxysmal A-fib (H) - I48.0  Acute alcoholic hepatitis (H) - K70.10     Transitions of Care Assessment    Discharge Assessment  How are you doing now that you are home?: good, still in A Fib  How are your symptoms? (Red Flag symptoms escalate to triage hotline per guidelines): Improved  Do you know how to contact your clinic care team if you have future questions or changes to your health status? : Yes  Does the patient have their discharge instructions? : Yes  Does the patient have questions regarding their discharge instructions? : Yes (see comment)  Were you started on any new medications or were there changes to any of your previous medications? : Yes  Does the patient have all of their medications?:  Yes  Do you have questions regarding any of your medications? : Yes (see comment) (patient wondering why Eliquis was discontinued? will route message to Dr. Ruiz)  Do you have all of your needed medical supplies or equipment (DME)?  (i.e. oxygen tank, CPAP, cane, etc.): Yes    Follow up Plan     Discharge Follow-Up  Discharge follow up appointment scheduled in alignment with recommended follow up timeframe or Transitions of Risk Category? (Low = within 30 days; Moderate= within 14 days; High= within 7 days): Yes  Discharge Follow Up Appointment Date: 02/11/25  Discharge Follow Up Appointment Scheduled with?:  (cardiology on Friday 2/7/25)    Future Appointments   Date Time Provider Department Center   2/11/2025  1:20 PM Radu Ruiz MD [facility]       Outpatient Plan as outlined on AVS reviewed with patient.    For any urgent concerns, please contact our 24 hour nurse triage line: [phone] ([phone])       Siri Davalos RN

## 2025-02-04 NOTE — TELEPHONE ENCOUNTER
His CHADS2-VASc score is 1, and I wouldn't recommend anticoagulation for that.      Signed, Radu Ruiz MD, FAAP, FACP  Internal Medicine & Pediatrics

## 2025-02-04 NOTE — TELEPHONE ENCOUNTER
Called and informed patient of Dr. Evans response and patient verbalized understanding and very appreciative.  Siri Davalos RN on 2/4/2025 at 1:50 PM

## 2025-02-06 LAB — HEV IGG SER QL: NORMAL

## 2025-09-01 ENCOUNTER — HOSPITAL ENCOUNTER (EMERGENCY)
Facility: OTHER | Age: 50
Discharge: HOME | End: 2025-09-01

## 2025-09-01 VITALS
BODY MASS INDEX: 33.86 KG/M2 | HEIGHT: 72 IN | SYSTOLIC BLOOD PRESSURE: 132 MMHG | RESPIRATION RATE: 16 BRPM | WEIGHT: 250 LBS | TEMPERATURE: 98.1 F | OXYGEN SATURATION: 100 % | HEART RATE: 66 BPM | DIASTOLIC BLOOD PRESSURE: 85 MMHG

## 2025-09-01 DIAGNOSIS — Z76.0 ENCOUNTER FOR MEDICATION REFILL: Primary | ICD-10-CM

## 2025-09-01 PROCEDURE — 250N000013 HC RX MED GY IP 250 OP 250 PS 637

## 2025-09-01 PROCEDURE — 99283 EMERGENCY DEPT VISIT LOW MDM: CPT

## 2025-09-01 RX ORDER — OXYCODONE HYDROCHLORIDE 5 MG/1
5 TABLET ORAL EVERY 4 HOURS PRN
Qty: 20 TABLET | Refills: 0 | Status: SHIPPED | OUTPATIENT
Start: 2025-09-01 | End: 2025-09-01

## 2025-09-01 RX ORDER — OXYCODONE HYDROCHLORIDE 5 MG/1
5 TABLET ORAL ONCE
Refills: 0 | Status: COMPLETED | OUTPATIENT
Start: 2025-09-01 | End: 2025-09-01

## 2025-09-01 RX ORDER — OXYCODONE HYDROCHLORIDE 5 MG/1
5 TABLET ORAL EVERY 4 HOURS PRN
COMMUNITY
Start: 2025-08-29 | End: 2025-09-04

## 2025-09-01 RX ORDER — OXYCODONE HYDROCHLORIDE 5 MG/1
5 TABLET ORAL EVERY 4 HOURS PRN
Qty: 15 TABLET | Refills: 0 | Status: SHIPPED | OUTPATIENT
Start: 2025-09-01 | End: 2025-09-04

## 2025-09-01 RX ADMIN — OXYCODONE HYDROCHLORIDE 5 MG: 5 TABLET ORAL at 09:52

## 2025-09-01 ASSESSMENT — ENCOUNTER SYMPTOMS
BACK PAIN: 1
HEADACHES: 1

## 2025-09-01 ASSESSMENT — COLUMBIA-SUICIDE SEVERITY RATING SCALE - C-SSRS
1. IN THE PAST MONTH, HAVE YOU WISHED YOU WERE DEAD OR WISHED YOU COULD GO TO SLEEP AND NOT WAKE UP?: NO
2. HAVE YOU ACTUALLY HAD ANY THOUGHTS OF KILLING YOURSELF IN THE PAST MONTH?: NO
6. HAVE YOU EVER DONE ANYTHING, STARTED TO DO ANYTHING, OR PREPARED TO DO ANYTHING TO END YOUR LIFE?: NO

## 2025-09-04 ENCOUNTER — OFFICE VISIT (OUTPATIENT)
Dept: INTERNAL MEDICINE | Facility: OTHER | Age: 50
End: 2025-09-04

## 2025-09-04 VITALS
DIASTOLIC BLOOD PRESSURE: 93 MMHG | SYSTOLIC BLOOD PRESSURE: 155 MMHG | WEIGHT: 249.4 LBS | TEMPERATURE: 98.5 F | BODY MASS INDEX: 33.82 KG/M2 | OXYGEN SATURATION: 98 % | RESPIRATION RATE: 16 BRPM | HEART RATE: 77 BPM

## 2025-09-04 DIAGNOSIS — E55.9 VITAMIN D DEFICIENCY: ICD-10-CM

## 2025-09-04 DIAGNOSIS — I48.91 ATRIAL FIBRILLATION WITH RAPID VENTRICULAR RESPONSE (H): ICD-10-CM

## 2025-09-04 DIAGNOSIS — S22.009A CLOSED FRACTURE OF TRANSVERSE PROCESS OF THORACIC VERTEBRA, INITIAL ENCOUNTER (H): ICD-10-CM

## 2025-09-04 DIAGNOSIS — I48.0 PAROXYSMAL A-FIB (H): ICD-10-CM

## 2025-09-04 DIAGNOSIS — S23.101A: ICD-10-CM

## 2025-09-04 DIAGNOSIS — I10 ESSENTIAL HYPERTENSION: Primary | ICD-10-CM

## 2025-09-04 PROBLEM — R55 SYNCOPE AND COLLAPSE: Status: ACTIVE | Noted: 2025-08-25

## 2025-09-04 PROBLEM — S02.19XA TEMPORAL BONE FRACTURE (H): Status: ACTIVE | Noted: 2025-08-25

## 2025-09-04 PROBLEM — S01.01XA SCALP LACERATION, INITIAL ENCOUNTER: Status: ACTIVE | Noted: 2025-08-25

## 2025-09-04 PROBLEM — I60.9 SAH (SUBARACHNOID HEMORRHAGE) (H): Status: ACTIVE | Noted: 2025-08-25
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
## 2025-09-04 LAB
ALBUMIN SERPL BCG-MCNC: 4 G/DL (ref 3.5–5.2)
ALP SERPL-CCNC: 74 U/L (ref 40–150)
ALT SERPL W P-5'-P-CCNC: 22 U/L (ref 0–70)
ANION GAP SERPL CALCULATED.3IONS-SCNC: 10 MMOL/L (ref 7–15)
AST SERPL W P-5'-P-CCNC: 28 U/L (ref 0–45)
BASOPHILS # BLD AUTO: 0.13 10E3/UL (ref 0–0.2)
BASOPHILS NFR BLD AUTO: 1.3 %
BILIRUB SERPL-MCNC: 0.4 MG/DL
BUN SERPL-MCNC: 8.8 MG/DL (ref 6–20)
CALCIUM SERPL-MCNC: 9.8 MG/DL (ref 8.8–10.4)
CHLORIDE SERPL-SCNC: 106 MMOL/L (ref 98–107)
CREAT SERPL-MCNC: 1.03 MG/DL (ref 0.67–1.17)
EGFRCR SERPLBLD CKD-EPI 2021: 87 ML/MIN/1.73M2
EOSINOPHIL # BLD AUTO: 0.6 10E3/UL (ref 0–0.7)
EOSINOPHIL NFR BLD AUTO: 6.1 %
ERYTHROCYTE [DISTWIDTH] IN BLOOD BY AUTOMATED COUNT: 13.2 % (ref 10–15)
GLUCOSE SERPL-MCNC: 96 MG/DL (ref 70–99)
HCO3 SERPL-SCNC: 25 MMOL/L (ref 22–29)
HCT VFR BLD AUTO: 34.4 % (ref 40–53)
HGB BLD-MCNC: 11.3 G/DL (ref 13.3–17.7)
IMM GRANULOCYTES # BLD: 0.03 10E3/UL
IMM GRANULOCYTES NFR BLD: 0.3 %
LYMPHOCYTES # BLD AUTO: 2.29 10E3/UL (ref 0.8–5.3)
LYMPHOCYTES NFR BLD AUTO: 23.2 %
MCH RBC QN AUTO: 33 PG (ref 26.5–33)
MCHC RBC AUTO-ENTMCNC: 32.8 G/DL (ref 31.5–36.5)
MCV RBC AUTO: 100.6 FL (ref 78–100)
MONOCYTES # BLD AUTO: 0.68 10E3/UL (ref 0–1.3)
MONOCYTES NFR BLD AUTO: 6.9 %
NEUTROPHILS # BLD AUTO: 6.13 10E3/UL (ref 1.6–8.3)
NEUTROPHILS NFR BLD AUTO: 62.2 %
NRBC # BLD AUTO: <0.03 10E3/UL
NRBC BLD AUTO-RTO: 0 /100
PLATELET # BLD AUTO: 351 10E3/UL (ref 150–450)
POTASSIUM SERPL-SCNC: 4.2 MMOL/L (ref 3.4–5.3)
PROT SERPL-MCNC: 7.7 G/DL (ref 6.4–8.3)
RBC # BLD AUTO: 3.42 10E6/UL (ref 4.4–5.9)
SODIUM SERPL-SCNC: 141 MMOL/L (ref 135–145)
WBC # BLD AUTO: 9.86 10E3/UL (ref 4–11)

## 2025-09-04 PROCEDURE — 36415 COLL VENOUS BLD VENIPUNCTURE: CPT | Mod: ZL

## 2025-09-04 PROCEDURE — 85025 COMPLETE CBC W/AUTO DIFF WBC: CPT | Mod: ZL

## 2025-09-04 PROCEDURE — 82247 BILIRUBIN TOTAL: CPT | Mod: ZL

## 2025-09-04 RX ORDER — METOPROLOL TARTRATE 50 MG
50 TABLET ORAL 2 TIMES DAILY
COMMUNITY
Start: 2025-09-04

## 2025-09-04 RX ORDER — ACETAMINOPHEN 325 MG/1
650 TABLET ORAL EVERY 6 HOURS PRN
COMMUNITY
Start: 2025-08-29 | End: 2025-09-08

## 2025-09-04 RX ORDER — LISINOPRIL 40 MG/1
40 TABLET ORAL AT BEDTIME
COMMUNITY
Start: 2025-02-07

## 2025-09-04 RX ORDER — OXYCODONE HYDROCHLORIDE 5 MG/1
5 TABLET ORAL EVERY 4 HOURS
Qty: 90 TABLET | Refills: 0 | Status: SHIPPED | OUTPATIENT
Start: 2025-09-04

## 2025-09-04 RX ORDER — LIDOCAINE 50 MG/G
PATCH TOPICAL EVERY 24 HOURS
Qty: 30 PATCH | Refills: 3 | Status: SHIPPED | OUTPATIENT
Start: 2025-09-04

## 2025-09-04 RX ORDER — METHOCARBAMOL 500 MG/1
500-1000 TABLET, FILM COATED ORAL 4 TIMES DAILY PRN
Qty: 180 TABLET | Refills: 4 | Status: SHIPPED | OUTPATIENT
Start: 2025-09-04

## 2025-09-04 RX ORDER — LISINOPRIL 20 MG/1
20 TABLET ORAL DAILY
COMMUNITY
Start: 2025-09-04

## 2025-09-04 RX ORDER — METHOCARBAMOL 750 MG/1
562.5 TABLET, FILM COATED ORAL 3 TIMES DAILY
COMMUNITY
Start: 2025-08-29 | End: 2026-11-22

## 2025-09-04 RX ORDER — LIDOCAINE 50 MG/G
PATCH TOPICAL EVERY 24 HOURS
COMMUNITY
End: 2025-09-04

## 2025-09-04 RX ORDER — AMOXICILLIN 250 MG
1 CAPSULE ORAL 2 TIMES DAILY
COMMUNITY
Start: 2025-08-29 | End: 2025-09-12

## 2025-09-04 ASSESSMENT — PAIN SCALES - GENERAL: PAINLEVEL_OUTOF10: MODERATE PAIN (6)

## (undated) DEVICE — Device

## (undated) DEVICE — ENDO KIT COMPLIANCE DYKENDOCMPLY

## (undated) DEVICE — TUBING SUCTION 10'X3/16" N510

## (undated) DEVICE — SUCTION MANIFOLD NEPTUNE 2 SYS 4 PORT 0702-020-000

## (undated) DEVICE — SOL WATER 1500ML

## (undated) DEVICE — ENDO BITE BLOCK 60 MAXI LF 00712804

## (undated) DEVICE — ENDO FORCEP ENDOJAW BIOPSY 2.8MMX230CM FB-220U

## (undated) DEVICE — SYR 50ML LL W/O NDL 309653

## (undated) RX ORDER — GLYCOPYRROLATE 0.2 MG/ML
INJECTION, SOLUTION INTRAMUSCULAR; INTRAVENOUS
Status: DISPENSED
Start: 2024-07-18

## (undated) RX ORDER — PROPOFOL 10 MG/ML
INJECTION, EMULSION INTRAVENOUS
Status: DISPENSED
Start: 2024-07-18

## (undated) RX ORDER — CALCIUM GLUCONATE 94 MG/ML
INJECTION, SOLUTION INTRAVENOUS
Status: DISPENSED
Start: 2025-02-01

## (undated) RX ORDER — DEXAMETHASONE SODIUM PHOSPHATE 4 MG/ML
INJECTION, SOLUTION INTRA-ARTICULAR; INTRALESIONAL; INTRAMUSCULAR; INTRAVENOUS; SOFT TISSUE
Status: DISPENSED
Start: 2024-07-18

## (undated) RX ORDER — OXYCODONE HYDROCHLORIDE 5 MG/1
TABLET ORAL
Status: DISPENSED
Start: 2025-09-01

## (undated) RX ORDER — POTASSIUM CHLORIDE 1500 MG/1
TABLET, EXTENDED RELEASE ORAL
Status: DISPENSED
Start: 2025-02-01

## (undated) RX ORDER — LORAZEPAM 2 MG/ML
INJECTION INTRAMUSCULAR
Status: DISPENSED
Start: 2025-02-01

## (undated) RX ORDER — LORAZEPAM 1 MG/1
TABLET ORAL
Status: DISPENSED
Start: 2022-04-29

## (undated) RX ORDER — ONDANSETRON 2 MG/ML
INJECTION INTRAMUSCULAR; INTRAVENOUS
Status: DISPENSED
Start: 2024-07-18

## (undated) RX ORDER — DILTIAZEM HCL/D5W 125 MG/125
PLASTIC BAG, INJECTION (ML) INTRAVENOUS
Status: DISPENSED
Start: 2025-02-01